# Patient Record
Sex: FEMALE | Race: WHITE | NOT HISPANIC OR LATINO
[De-identification: names, ages, dates, MRNs, and addresses within clinical notes are randomized per-mention and may not be internally consistent; named-entity substitution may affect disease eponyms.]

---

## 2017-09-20 ENCOUNTER — TRANSCRIPTION ENCOUNTER (OUTPATIENT)
Age: 77
End: 2017-09-20

## 2017-09-23 ENCOUNTER — TRANSCRIPTION ENCOUNTER (OUTPATIENT)
Age: 77
End: 2017-09-23

## 2018-10-08 ENCOUNTER — APPOINTMENT (OUTPATIENT)
Dept: ORTHOPEDIC SURGERY | Facility: CLINIC | Age: 78
End: 2018-10-08
Payer: MEDICARE

## 2018-10-08 VITALS
HEART RATE: 55 BPM | HEIGHT: 64 IN | SYSTOLIC BLOOD PRESSURE: 133 MMHG | DIASTOLIC BLOOD PRESSURE: 80 MMHG | WEIGHT: 155 LBS | BODY MASS INDEX: 26.46 KG/M2

## 2018-10-08 DIAGNOSIS — M25.552 PAIN IN LEFT HIP: ICD-10-CM

## 2018-10-08 DIAGNOSIS — M47.816 SPONDYLOSIS W/OUT MYELOPATHY OR RADICULOPATHY, LUMBAR REGION: ICD-10-CM

## 2018-10-08 DIAGNOSIS — M17.0 BILATERAL PRIMARY OSTEOARTHRITIS OF KNEE: ICD-10-CM

## 2018-10-08 PROCEDURE — 99204 OFFICE O/P NEW MOD 45 MIN: CPT

## 2018-10-08 PROCEDURE — 73502 X-RAY EXAM HIP UNI 2-3 VIEWS: CPT

## 2022-06-30 ENCOUNTER — APPOINTMENT (OUTPATIENT)
Dept: PODIATRY | Facility: CLINIC | Age: 82
End: 2022-06-30

## 2022-06-30 VITALS — BODY MASS INDEX: 26.46 KG/M2 | HEIGHT: 64 IN | WEIGHT: 155 LBS

## 2022-06-30 DIAGNOSIS — S99.921A UNSPECIFIED INJURY OF RIGHT FOOT, INITIAL ENCOUNTER: ICD-10-CM

## 2022-06-30 DIAGNOSIS — S92.514A NONDISPLACED FRACTURE OF PROXIMAL PHALANX OF RIGHT LESSER TOE(S), INITIAL ENCOUNTER FOR CLOSED FRACTURE: ICD-10-CM

## 2022-06-30 DIAGNOSIS — S99.929A UNSPECIFIED INJURY OF UNSPECIFIED FOOT, INITIAL ENCOUNTER: ICD-10-CM

## 2022-06-30 PROCEDURE — 99203 OFFICE O/P NEW LOW 30 MIN: CPT

## 2022-06-30 PROCEDURE — 73630 X-RAY EXAM OF FOOT: CPT | Mod: RT

## 2022-06-30 NOTE — PHYSICAL EXAM
[Mild] : mild swelling of dorsal foot [2nd] : 2nd [3rd] : 3rd [NL 30)] : inversion 30 degrees [NL (40)] : MTP joint DF 40 degrees [NL (20)] : MTP joint PF 20 degrees [5___] : [facility] 5[unfilled]/5 [2+] : posterior tibialis pulse: 2+ [Right] : right foot [The fracture is in acceptable alignment. There is progression in healing seen] : The fracture is in acceptable alignment. There is progression in healing seen [Fracture] : Fracture [] : non-antalgic [de-identified] : Proximal phalanx 2nd and 3rd digit right foot

## 2022-08-02 ENCOUNTER — FORM ENCOUNTER (OUTPATIENT)
Age: 82
End: 2022-08-02

## 2022-08-08 ENCOUNTER — OUTPATIENT (OUTPATIENT)
Dept: OUTPATIENT SERVICES | Facility: HOSPITAL | Age: 82
LOS: 1 days | End: 2022-08-08
Payer: MEDICARE

## 2022-08-08 ENCOUNTER — APPOINTMENT (OUTPATIENT)
Dept: CT IMAGING | Facility: IMAGING CENTER | Age: 82
End: 2022-08-08

## 2022-08-08 DIAGNOSIS — Z00.8 ENCOUNTER FOR OTHER GENERAL EXAMINATION: ICD-10-CM

## 2022-08-08 PROCEDURE — 70450 CT HEAD/BRAIN W/O DYE: CPT | Mod: 26,MH

## 2022-08-08 PROCEDURE — 70450 CT HEAD/BRAIN W/O DYE: CPT | Mod: MH

## 2022-08-12 ENCOUNTER — FORM ENCOUNTER (OUTPATIENT)
Age: 82
End: 2022-08-12

## 2022-08-17 ENCOUNTER — OUTPATIENT (OUTPATIENT)
Dept: OUTPATIENT SERVICES | Facility: HOSPITAL | Age: 82
LOS: 1 days | End: 2022-08-17
Payer: MEDICARE

## 2022-08-17 ENCOUNTER — APPOINTMENT (OUTPATIENT)
Dept: MRI IMAGING | Facility: HOSPITAL | Age: 82
End: 2022-08-17

## 2022-08-17 DIAGNOSIS — S63.592A OTHER SPECIFIED SPRAIN OF LEFT WRIST, INITIAL ENCOUNTER: ICD-10-CM
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
## 2022-08-17 DIAGNOSIS — R60.0 LOCALIZED EDEMA: ICD-10-CM

## 2022-08-17 DIAGNOSIS — S62.115A NONDISPLACED FRACTURE OF TRIQUETRUM [CUNEIFORM] BONE, LEFT WRIST, INITIAL ENCOUNTER FOR CLOSED FRACTURE: ICD-10-CM

## 2022-08-17 DIAGNOSIS — M25.532 PAIN IN LEFT WRIST: ICD-10-CM

## 2022-08-17 PROCEDURE — 73221 MRI JOINT UPR EXTREM W/O DYE: CPT | Mod: MH

## 2022-08-17 PROCEDURE — 73221 MRI JOINT UPR EXTREM W/O DYE: CPT | Mod: 26,LT,MH

## 2022-09-27 ENCOUNTER — APPOINTMENT (OUTPATIENT)
Dept: ORTHOPEDIC SURGERY | Facility: CLINIC | Age: 82
End: 2022-09-27

## 2022-09-27 DIAGNOSIS — S46.011A STRAIN OF MUSCLE(S) AND TENDON(S) OF THE ROTATOR CUFF OF RIGHT SHOULDER, INITIAL ENCOUNTER: ICD-10-CM

## 2022-09-27 DIAGNOSIS — M17.12 UNILATERAL PRIMARY OSTEOARTHRITIS, LEFT KNEE: ICD-10-CM

## 2022-09-27 PROCEDURE — 99204 OFFICE O/P NEW MOD 45 MIN: CPT | Mod: 25

## 2022-09-27 PROCEDURE — 20610 DRAIN/INJ JOINT/BURSA W/O US: CPT | Mod: LT

## 2022-09-27 NOTE — DISCUSSION/SUMMARY
[de-identified] : She tolerated the local injection very well and following the injection her pain was remarkably improved and her left knee she will try physical therapy for her right shoulder and to follow conservative measures for her left knee.  She is told to be cautious with nonsteroidal anti-inflammatory agents but she will speak to her medical doctor she does have a prescription for meloxicam she does she will take superior sparingly if she has discomfort return visit in 3 to 6 months.

## 2022-09-27 NOTE — PROCEDURE
[de-identified] : Procedure Note:\par \par Anatomic Location:  Left Knee\par \par Diagnosis:  Arthritis\par \par Procedure:  Injection of 2cc  of Marcaine 0.25% plain and Celestone 1cc, 6mg\par \par Local Spray: Ethyl Chloride.\par \par \par Patient has consented for the procedure.\par \par Injection  through a lateral parapatella approach.\par \par Patient tolerated the procedure well.\par \par Patient instructed to call the office if any reaction, fever, chills, increased erythema or swelling.   [phone].

## 2022-09-27 NOTE — HISTORY OF PRESENT ILLNESS
[de-identified] : 82 year old female presents today with left knee pain. She had two falls in July and August 2022 falling down steps sustaining injuries to her left wrist, right shoulder and both knees. She has been under the care of Dr. Hernandez for left knee pain and Dr. Whyte for a left wrist fracture which is now healed per patient. She was treated with a gel One injection to the left knee with hyaluronic acid on September 5, 2022 which has not helped thus far. She occasionally takes Advil 600 mg which helps minimally. She has difficulty standing from a chair. She ambulates with a cane. She complains of lesser pain in the right knee. \par The patient presents with xray and MRI images of the left knee for review. \par

## 2022-09-27 NOTE — PHYSICAL EXAM
[de-identified] : Constitutional - the patient is of normal build and nutrition.  The patient remains oriented to person, time, and  place.  Mood is normal. Vital signs as recorded.  The patients gait is with pain in her left knee. The patient has satisfactory  balance and can stand on toes and heels.  She comes in her with her  who is a retired dentist.\par \par The patient has no difficulty with respiration. Respiration at rest is a normal rate. The patient is not short of breath and has not become short of breath with short ambulation. There is no audible wheezing. No coughing.\par \par Skin is normal for the patient's age. There are no abnormal masses or lymph nodes which stand out in the lower extremities.\par \par Spine - deep tendon reflexes are symmetric. Motor and sensory are symmetric.\par \par \par UPPER EXTREMITIES \par \par Shoulders her left shoulder is a full range of motion and no pain with motion in her right shoulder does abduct to 150 degrees slowly flexes 160 degrees internally rotates 100 degrees external rotate 45 degrees she has pain in the area of the rotator cuff and pain with her arm and shoulder at 90–90 position and external rotation of her shoulder.  This does indicate is some impingement of her right rotator.  This was injected by Dr. Sr with cortisone.  She has had some improvement since.\par \par There is normal motion in the wrists and elbows.  She has had a fracture of her left wrist.\par \par Circulation appears satisfactory with pedal pulses present.  There is no major edema in the lower legs. No skin tenderness or increased temperature. No major varicosities.\par \par HIP EXAMINATION the abduction and abduction as well as rotation measurements were taken with the hip in flexion.\par \par Motion\par There is symmetric motion with flexion 135 degrees, abduction 80 degrees, adduction 30 degrees, external rotation 80 degrees, internal rotation 20 degrees.\par \par The hips have good range of motion. There is good strength across the hips. There is no crepitus in either hip. The alignment of the hips is normal.\par \par \par KNEE EXAMINATION\par \par Motion\par Right Knee has 0 to 135 degrees of motion with good medial  lateral and anterior posterior stability.  There is no major effusion.  There is no Baker's cyst.  There is no significant patellofemoral crepitus.  The patient has satisfactory strength across the knee.               \par Left  Knee   has 0 to 125 degrees of motion with good medial lateral and anterior posterior stability.  At this time she has no significant effusion and no Baker's cyst.  She has pain with palpation over the lateral joint line and a positive lateral Steinmann test and some discomfort with compression of her patella.  She did receive an injection with hyaluronic acid recently but it is not causing any significant improvement.\par \par \par Ankle and foot examination\par Of the ankle has normal motion.  There is normal ankle stability.  The patient has no major abnormalities of the foot.  She recently fractured toes 2 and 3 in her right foot.  Is managing well.\par \par \par \par  [de-identified] : She has had a x-ray of her knee which shows normal alignment of her joint it is medial lateral joint spaces are maintained.  However the MRI did show tricompartmental arthritis with significant wear of the anterior portion of the lateral meniscus mucoid degeneration of the anterior cruciate but without tear.  Impression mild degenerative osteoarthritis of the left knee with a degenerative lateral meniscus.

## 2024-09-12 ENCOUNTER — TRANSCRIPTION ENCOUNTER (OUTPATIENT)
Age: 84
End: 2024-09-12

## 2024-09-12 ENCOUNTER — RESULT REVIEW (OUTPATIENT)
Age: 84
End: 2024-09-12

## 2024-09-12 ENCOUNTER — INPATIENT (INPATIENT)
Facility: HOSPITAL | Age: 84
LOS: 4 days | Discharge: NURSING, REHAB OR LONG-TERM CARE | End: 2024-09-17
Attending: ORTHOPAEDIC SURGERY | Admitting: ORTHOPAEDIC SURGERY
Payer: MEDICARE

## 2024-09-12 VITALS
DIASTOLIC BLOOD PRESSURE: 98 MMHG | SYSTOLIC BLOOD PRESSURE: 173 MMHG | OXYGEN SATURATION: 97 % | RESPIRATION RATE: 15 BRPM | WEIGHT: 154.98 LBS | TEMPERATURE: 98 F | HEART RATE: 59 BPM

## 2024-09-12 DIAGNOSIS — Z29.9 ENCOUNTER FOR PROPHYLACTIC MEASURES, UNSPECIFIED: ICD-10-CM

## 2024-09-12 DIAGNOSIS — S72.001A FRACTURE OF UNSPECIFIED PART OF NECK OF RIGHT FEMUR, INITIAL ENCOUNTER FOR CLOSED FRACTURE: ICD-10-CM

## 2024-09-12 DIAGNOSIS — I25.10 ATHEROSCLEROTIC HEART DISEASE OF NATIVE CORONARY ARTERY WITHOUT ANGINA PECTORIS: ICD-10-CM

## 2024-09-12 DIAGNOSIS — I10 ESSENTIAL (PRIMARY) HYPERTENSION: ICD-10-CM

## 2024-09-12 DIAGNOSIS — S72.144A NONDISPLACED INTERTROCHANTERIC FRACTURE OF RIGHT FEMUR, INITIAL ENCOUNTER FOR CLOSED FRACTURE: ICD-10-CM

## 2024-09-12 LAB
ALBUMIN SERPL ELPH-MCNC: 3.6 G/DL — SIGNIFICANT CHANGE UP (ref 3.3–5)
ALP SERPL-CCNC: 81 U/L — SIGNIFICANT CHANGE UP (ref 40–120)
ALT FLD-CCNC: 30 U/L — SIGNIFICANT CHANGE UP (ref 4–33)
ANION GAP SERPL CALC-SCNC: 12 MMOL/L — SIGNIFICANT CHANGE UP (ref 7–14)
APPEARANCE UR: ABNORMAL
APTT BLD: 31.3 SEC — SIGNIFICANT CHANGE UP (ref 24.5–35.6)
AST SERPL-CCNC: 36 U/L — HIGH (ref 4–32)
BACTERIA # UR AUTO: NEGATIVE /HPF — SIGNIFICANT CHANGE UP
BASOPHILS # BLD AUTO: 0.05 K/UL — SIGNIFICANT CHANGE UP (ref 0–0.2)
BASOPHILS NFR BLD AUTO: 0.6 % — SIGNIFICANT CHANGE UP (ref 0–2)
BILIRUB SERPL-MCNC: 0.3 MG/DL — SIGNIFICANT CHANGE UP (ref 0.2–1.2)
BILIRUB UR-MCNC: NEGATIVE — SIGNIFICANT CHANGE UP
BLD GP AB SCN SERPL QL: NEGATIVE — SIGNIFICANT CHANGE UP
BUN SERPL-MCNC: 18 MG/DL — SIGNIFICANT CHANGE UP (ref 7–23)
CALCIUM SERPL-MCNC: 8.9 MG/DL — SIGNIFICANT CHANGE UP (ref 8.4–10.5)
CAST: 0 /LPF — SIGNIFICANT CHANGE UP (ref 0–4)
CHLORIDE SERPL-SCNC: 103 MMOL/L — SIGNIFICANT CHANGE UP (ref 98–107)
CO2 SERPL-SCNC: 24 MMOL/L — SIGNIFICANT CHANGE UP (ref 22–31)
COLOR SPEC: YELLOW — SIGNIFICANT CHANGE UP
CREAT SERPL-MCNC: 0.62 MG/DL — SIGNIFICANT CHANGE UP (ref 0.5–1.3)
DIFF PNL FLD: NEGATIVE — SIGNIFICANT CHANGE UP
EGFR: 87 ML/MIN/1.73M2 — SIGNIFICANT CHANGE UP
EOSINOPHIL # BLD AUTO: 0.17 K/UL — SIGNIFICANT CHANGE UP (ref 0–0.5)
EOSINOPHIL NFR BLD AUTO: 1.9 % — SIGNIFICANT CHANGE UP (ref 0–6)
GLUCOSE SERPL-MCNC: 113 MG/DL — HIGH (ref 70–99)
GLUCOSE UR QL: NEGATIVE MG/DL — SIGNIFICANT CHANGE UP
HCT VFR BLD CALC: 38.4 % — SIGNIFICANT CHANGE UP (ref 34.5–45)
HGB BLD-MCNC: 12.6 G/DL — SIGNIFICANT CHANGE UP (ref 11.5–15.5)
IANC: 7.47 K/UL — HIGH (ref 1.8–7.4)
IMM GRANULOCYTES NFR BLD AUTO: 1.3 % — HIGH (ref 0–0.9)
INR BLD: 0.98 RATIO — SIGNIFICANT CHANGE UP (ref 0.85–1.18)
KETONES UR-MCNC: NEGATIVE MG/DL — SIGNIFICANT CHANGE UP
LEUKOCYTE ESTERASE UR-ACNC: NEGATIVE — SIGNIFICANT CHANGE UP
LYMPHOCYTES # BLD AUTO: 0.69 K/UL — LOW (ref 1–3.3)
LYMPHOCYTES # BLD AUTO: 7.6 % — LOW (ref 13–44)
MCHC RBC-ENTMCNC: 30.6 PG — SIGNIFICANT CHANGE UP (ref 27–34)
MCHC RBC-ENTMCNC: 32.8 GM/DL — SIGNIFICANT CHANGE UP (ref 32–36)
MCV RBC AUTO: 93.2 FL — SIGNIFICANT CHANGE UP (ref 80–100)
MONOCYTES # BLD AUTO: 0.59 K/UL — SIGNIFICANT CHANGE UP (ref 0–0.9)
MONOCYTES NFR BLD AUTO: 6.5 % — SIGNIFICANT CHANGE UP (ref 2–14)
NEUTROPHILS # BLD AUTO: 7.47 K/UL — HIGH (ref 1.8–7.4)
NEUTROPHILS NFR BLD AUTO: 82.1 % — HIGH (ref 43–77)
NITRITE UR-MCNC: NEGATIVE — SIGNIFICANT CHANGE UP
NRBC # BLD: 0 /100 WBCS — SIGNIFICANT CHANGE UP (ref 0–0)
NRBC # FLD: 0 K/UL — SIGNIFICANT CHANGE UP (ref 0–0)
PH UR: 8 — SIGNIFICANT CHANGE UP (ref 5–8)
PLATELET # BLD AUTO: 213 K/UL — SIGNIFICANT CHANGE UP (ref 150–400)
POTASSIUM SERPL-MCNC: 4.2 MMOL/L — SIGNIFICANT CHANGE UP (ref 3.5–5.3)
POTASSIUM SERPL-SCNC: 4.2 MMOL/L — SIGNIFICANT CHANGE UP (ref 3.5–5.3)
PROT SERPL-MCNC: 6.1 G/DL — SIGNIFICANT CHANGE UP (ref 6–8.3)
PROT UR-MCNC: NEGATIVE MG/DL — SIGNIFICANT CHANGE UP
PROTHROM AB SERPL-ACNC: 11.1 SEC — SIGNIFICANT CHANGE UP (ref 9.5–13)
RBC # BLD: 4.12 M/UL — SIGNIFICANT CHANGE UP (ref 3.8–5.2)
RBC # FLD: 15.8 % — HIGH (ref 10.3–14.5)
RBC CASTS # UR COMP ASSIST: 2 /HPF — SIGNIFICANT CHANGE UP (ref 0–4)
RH IG SCN BLD-IMP: POSITIVE — SIGNIFICANT CHANGE UP
SODIUM SERPL-SCNC: 139 MMOL/L — SIGNIFICANT CHANGE UP (ref 135–145)
SP GR SPEC: 1.02 — SIGNIFICANT CHANGE UP (ref 1–1.03)
SQUAMOUS # UR AUTO: 1 /HPF — SIGNIFICANT CHANGE UP (ref 0–5)
UROBILINOGEN FLD QL: 0.2 MG/DL — SIGNIFICANT CHANGE UP (ref 0.2–1)
WBC # BLD: 9.09 K/UL — SIGNIFICANT CHANGE UP (ref 3.8–10.5)
WBC # FLD AUTO: 9.09 K/UL — SIGNIFICANT CHANGE UP (ref 3.8–10.5)
WBC UR QL: 0 /HPF — SIGNIFICANT CHANGE UP (ref 0–5)

## 2024-09-12 PROCEDURE — 93306 TTE W/DOPPLER COMPLETE: CPT | Mod: 26

## 2024-09-12 PROCEDURE — 99223 1ST HOSP IP/OBS HIGH 75: CPT

## 2024-09-12 PROCEDURE — 73502 X-RAY EXAM HIP UNI 2-3 VIEWS: CPT | Mod: 26,RT

## 2024-09-12 PROCEDURE — 99285 EMERGENCY DEPT VISIT HI MDM: CPT | Mod: GC

## 2024-09-12 PROCEDURE — 73562 X-RAY EXAM OF KNEE 3: CPT | Mod: 26,RT

## 2024-09-12 PROCEDURE — 71045 X-RAY EXAM CHEST 1 VIEW: CPT | Mod: 26

## 2024-09-12 PROCEDURE — 73552 X-RAY EXAM OF FEMUR 2/>: CPT | Mod: 26,RT

## 2024-09-12 RX ORDER — OXYCODONE HYDROCHLORIDE 5 MG/1
2.5 TABLET ORAL EVERY 4 HOURS
Refills: 0 | Status: DISCONTINUED | OUTPATIENT
Start: 2024-09-12 | End: 2024-09-12

## 2024-09-12 RX ORDER — SERTRALINE HYDROCHLORIDE 50 MG/1
1 TABLET, FILM COATED ORAL
Refills: 0 | DISCHARGE

## 2024-09-12 RX ORDER — ONDANSETRON 2 MG/ML
4 INJECTION, SOLUTION INTRAMUSCULAR; INTRAVENOUS ONCE
Refills: 0 | Status: COMPLETED | OUTPATIENT
Start: 2024-09-12 | End: 2024-09-12

## 2024-09-12 RX ORDER — EZETIMIBE 10 MG/1
10 TABLET ORAL DAILY
Refills: 0 | Status: DISCONTINUED | OUTPATIENT
Start: 2024-09-12 | End: 2024-09-17

## 2024-09-12 RX ORDER — EZETIMIBE 10 MG/1
1 TABLET ORAL
Refills: 0 | DISCHARGE

## 2024-09-12 RX ORDER — ROSUVASTATIN CALCIUM 10 MG/1
1 TABLET ORAL
Refills: 0 | DISCHARGE

## 2024-09-12 RX ORDER — POVIDONE-IODINE 10 %
1 SOLUTION, NON-ORAL TOPICAL ONCE
Refills: 0 | Status: COMPLETED | OUTPATIENT
Start: 2024-09-12 | End: 2024-09-13

## 2024-09-12 RX ORDER — ATENOLOL 100 MG
1 TABLET ORAL
Refills: 0 | DISCHARGE

## 2024-09-12 RX ORDER — ENOXAPARIN SODIUM 100 MG/ML
40 INJECTION SUBCUTANEOUS ONCE
Refills: 0 | Status: COMPLETED | OUTPATIENT
Start: 2024-09-12 | End: 2024-09-12

## 2024-09-12 RX ORDER — RANOLAZINE 500 MG/1
500 TABLET, FILM COATED, EXTENDED RELEASE ORAL
Refills: 0 | Status: DISCONTINUED | OUTPATIENT
Start: 2024-09-12 | End: 2024-09-17

## 2024-09-12 RX ORDER — SODIUM CHLORIDE 9 MG/ML
1000 INJECTION INTRAMUSCULAR; INTRAVENOUS; SUBCUTANEOUS
Refills: 0 | Status: DISCONTINUED | OUTPATIENT
Start: 2024-09-12 | End: 2024-09-16

## 2024-09-12 RX ORDER — RANOLAZINE 500 MG/1
1 TABLET, FILM COATED, EXTENDED RELEASE ORAL
Refills: 0 | DISCHARGE

## 2024-09-12 RX ORDER — FLU VACCINE TS 2012-2013(5YR+) 45MCG/.5ML
0.5 VIAL (ML) INTRAMUSCULAR ONCE
Refills: 0 | Status: COMPLETED | OUTPATIENT
Start: 2024-09-12 | End: 2024-09-12

## 2024-09-12 RX ORDER — ACETAMINOPHEN 325 MG/1
1000 TABLET ORAL ONCE
Refills: 0 | Status: COMPLETED | OUTPATIENT
Start: 2024-09-12 | End: 2024-09-12

## 2024-09-12 RX ORDER — OXYCODONE HYDROCHLORIDE 5 MG/1
5 TABLET ORAL EVERY 4 HOURS
Refills: 0 | Status: DISCONTINUED | OUTPATIENT
Start: 2024-09-12 | End: 2024-09-12

## 2024-09-12 RX ORDER — SERTRALINE HYDROCHLORIDE 50 MG/1
200 TABLET, FILM COATED ORAL DAILY
Refills: 0 | Status: DISCONTINUED | OUTPATIENT
Start: 2024-09-12 | End: 2024-09-17

## 2024-09-12 RX ORDER — ACETAMINOPHEN 325 MG/1
1000 TABLET ORAL ONCE
Refills: 0 | Status: DISCONTINUED | OUTPATIENT
Start: 2024-09-12 | End: 2024-09-12

## 2024-09-12 RX ORDER — METHENAMINE MANDELATE 1 G
1 TABLET ORAL
Refills: 0 | DISCHARGE

## 2024-09-12 RX ORDER — OXYCODONE HYDROCHLORIDE 5 MG/1
10 TABLET ORAL EVERY 4 HOURS
Refills: 0 | Status: DISCONTINUED | OUTPATIENT
Start: 2024-09-12 | End: 2024-09-17

## 2024-09-12 RX ORDER — CHLORHEXIDINE GLUCONATE 40 MG/ML
1 SOLUTION TOPICAL ONCE
Refills: 0 | Status: COMPLETED | OUTPATIENT
Start: 2024-09-12 | End: 2024-09-13

## 2024-09-12 RX ORDER — ATENOLOL 100 MG
25 TABLET ORAL DAILY
Refills: 0 | Status: DISCONTINUED | OUTPATIENT
Start: 2024-09-12 | End: 2024-09-17

## 2024-09-12 RX ORDER — OXYCODONE HYDROCHLORIDE 5 MG/1
5 TABLET ORAL EVERY 4 HOURS
Refills: 0 | Status: DISCONTINUED | OUTPATIENT
Start: 2024-09-12 | End: 2024-09-17

## 2024-09-12 RX ORDER — ROSUVASTATIN CALCIUM 10 MG/1
20 TABLET ORAL AT BEDTIME
Refills: 0 | Status: DISCONTINUED | OUTPATIENT
Start: 2024-09-12 | End: 2024-09-17

## 2024-09-12 RX ORDER — SENNA 187 MG
2 TABLET ORAL AT BEDTIME
Refills: 0 | Status: DISCONTINUED | OUTPATIENT
Start: 2024-09-12 | End: 2024-09-17

## 2024-09-12 RX ADMIN — ROSUVASTATIN CALCIUM 20 MILLIGRAM(S): 10 TABLET ORAL at 22:41

## 2024-09-12 RX ADMIN — OXYCODONE HYDROCHLORIDE 5 MILLIGRAM(S): 5 TABLET ORAL at 17:10

## 2024-09-12 RX ADMIN — Medication 4 MILLIGRAM(S): at 05:58

## 2024-09-12 RX ADMIN — OXYCODONE HYDROCHLORIDE 10 MILLIGRAM(S): 5 TABLET ORAL at 19:35

## 2024-09-12 RX ADMIN — Medication 4 MILLIGRAM(S): at 17:11

## 2024-09-12 RX ADMIN — ENOXAPARIN SODIUM 40 MILLIGRAM(S): 100 INJECTION SUBCUTANEOUS at 19:36

## 2024-09-12 RX ADMIN — SODIUM CHLORIDE 125 MILLILITER(S): 9 INJECTION INTRAMUSCULAR; INTRAVENOUS; SUBCUTANEOUS at 19:36

## 2024-09-12 RX ADMIN — OXYCODONE HYDROCHLORIDE 10 MILLIGRAM(S): 5 TABLET ORAL at 20:14

## 2024-09-12 RX ADMIN — Medication 4 MILLIGRAM(S): at 09:55

## 2024-09-12 RX ADMIN — OXYCODONE HYDROCHLORIDE 5 MILLIGRAM(S): 5 TABLET ORAL at 15:58

## 2024-09-12 RX ADMIN — OXYCODONE HYDROCHLORIDE 5 MILLIGRAM(S): 5 TABLET ORAL at 08:54

## 2024-09-12 NOTE — H&P ADULT - HISTORY OF PRESENT ILLNESS
84yFemale c/o R hip pain s/p mechanical fall while walking to the bathroom at 3am. Patient denies head hit or LOC. Patient denies numbness or tingling in the RLE. Patient denies any other injuries. At baseline, ambulates w RW. Pt takes aspirin 81 qd given she has multiple stents.    ROS: 10 point review of systems otherwise negative unless noted in HPI    PMH:    PSH:    AH:  Tylenol (Rash)    Meds: See med rec    T(C): 36.6 (09-12-24 @ 08:34)  HR: 57 (09-12-24 @ 08:34)  BP: 133/63 (09-12-24 @ 08:34)  RR: 16 (09-12-24 @ 08:34)  SpO2: 95% (09-12-24 @ 08:34)  Wt(kg): --                        12.6   9.09  )-----------( 213      ( 12 Sep 2024 06:00 )             38.4     09-12    139  |  103  |  18  ----------------------------<  113<H>  4.2   |  24  |  0.62    Ca    8.9      12 Sep 2024 06:00    TPro  6.1  /  Alb  3.6  /  TBili  0.3  /  DBili  x   /  AST  36<H>  /  ALT  30  /  AlkPhos  81  09-12    PT/INR - ( 12 Sep 2024 06:00 )   PT: 11.1 sec;   INR: 0.98 ratio         PTT - ( 12 Sep 2024 06:00 )  PTT:31.3 sec  Urinalysis Basic - ( 12 Sep 2024 06:00 )    Color: x / Appearance: x / SG: x / pH: x  Gluc: 113 mg/dL / Ketone: x  / Bili: x / Urobili: x   Blood: x / Protein: x / Nitrite: x   Leuk Esterase: x / RBC: x / WBC x   Sq Epi: x / Non Sq Epi: x / Bacteria: x        PE  Gen: NAD, alert and oriented  Resp: Unlabored breathing  RLE: Skin intact, no ecchymosis,        SILT DP/SP/ Brenda/Saph/Post Tib       +EHL/FHL/TA/Gastroc,        Knee/ankle painless ROM,        hip ROM limited 2/2 pain,       DP+,        soft compartments, no calf ttp,        +log roll.      Secondary:  No TTP over bony landmarks, SILT BL, ROM intact BL, distal pulses palpable.    Imaging:  XR demonstrating R IT fracture

## 2024-09-12 NOTE — CONSULT NOTE ADULT - ATTENDING COMMENTS
Personally saw and examined patient  labs and vitals reviewed  agree with above assessment and plan  84F HTN, HLD, CAD s/p PCI hx subdural hematoma s/p MMA embolization (2023), psoriasis. now p/w fall, hip frx  plan for surgical repair  pt able to ambulate up 1 flight of stairs (>4 mets)  denies syncope, vt vf scd  no edema orthopnea  tte today showing preserved lv systolic function, mild AS  pt is intermediate risk for intermediate risk orthopedic procedure and is optimized from a cv perspective and as procedure is urgent, no further cardiac testing is indicated and pt may proceed

## 2024-09-12 NOTE — CONSULT NOTE ADULT - SUBJECTIVE AND OBJECTIVE BOX
Lynda Jimenez MD  [facility] Division of Hospital Medicine  Pager [phone] (M-F 8AM-5PM)  Other Times: [id number]    Patient is a 84y old  Female who presents with a chief complaint of R IT fx (12 Sep 2024 09:14)    HPI:    84F with hx of CAD s/p PCI (26 years ago) on aspirin, SHD s/p MMA (), HTN, HLD who presents with right hip pain after fall found to have right hip fx pending right hip IMN (OR ). Patient is seen for preop examination. She says she had stents placed 26 years ago but still intermittently feels chest pain, and recently notes some LE swelling. She denies shortness of breath. She follows with cardiology in [place], workup reportedly negative. NO bleeding/clotting disorders. She denies fevers, chills, sob, abdominal pain, n/v/d, dysuria, sick contacts.     Review of Systems:   CONSTITUTIONAL: No fever, no fatigue  EYES: No eye pain or discharge  ENMT:  No sinus or throat pain  NECK: No pain or stiffness  RESPIRATORY: No cough, wheezing, chills or hemoptysis; No shortness of breath  CARDIOVASCULAR: No chest pain, palpitations, dizziness, or leg swelling  GASTROINTESTINAL: No abdominal or epigastric pain. No nausea, vomiting, or hematemesis; No diarrhea or constipation. No melena or hematochezia.  GENITOURINARY: No dysuria or incontinence  MUSCULOSKELETAL: No joint pain or swelling; No muscle, back, or extremity pain  NEUROLOGICAL: No headaches, memory loss, loss of strength, numbness, or tremors  PSYCHIATRIC: No depression, anxiety, mood swings, or difficulty sleeping  SKIN: No rashes, no skin changes    PAST MEDICAL & SURGICAL HISTORY:    FAMILY HISTORY:    SOCIAL HISTORY:     Allergies    sulfa drugs (Hives)  Tylenol (Rash)    Intolerances    Home Medications:  aspirin 81 mg oral tablet: 1 tab(s) orally once a day (12 Sep 2024 10:00)  atenolol 25 mg oral tablet: 1 tab(s) orally once a day (12 Sep 2024 10:00)  Crestor 20 mg oral tablet: 1 tab(s) orally once a day (12 Sep 2024 10:00)  Hiprex 1 g oral tablet: 1 tab(s) orally once a day (12 Sep 2024 10:00)  Ranexa 500 mg oral tablet, extended release: 1 tab(s) orally 2 times a day (12 Sep 2024 10:00)  sertraline 200 mg oral capsule: 1 cap(s) orally once a day (12 Sep 2024 10:00)  Zetia 10 mg oral tablet: 1 tab(s) orally once a day (12 Sep 2024 10:00)      MEDICATIONS  (STANDING):  atenolol  Tablet 25 milliGRAM(s) Oral daily  chlorhexidine 2% Cloths 1 Application(s) Topical once  enoxaparin Injectable 40 milliGRAM(s) SubCutaneous once  ezetimibe 10 milliGRAM(s) Oral daily  povidone iodine 10% Nasal Swab 1 Application(s) Both Nostrils once  ranolazine 500 milliGRAM(s) Oral two times a day  rosuvastatin 20 milliGRAM(s) Oral at bedtime  senna 2 Tablet(s) Oral at bedtime  sertraline 200 milliGRAM(s) Oral daily  sodium chloride 0.9%. 1000 milliLiter(s) (125 mL/Hr) IV Continuous <Continuous>    MEDICATIONS  (PRN):  oxyCODONE    IR 2.5 milliGRAM(s) Oral every 4 hours PRN Moderate Pain (4 - 6)  oxyCODONE    IR 5 milliGRAM(s) Oral every 4 hours PRN Severe Pain (7 - 10)      PHYSICAL EXAM:  Vital Signs Last 24 Hrs  T(C): 36.6 (12 Sep 2024 08:34), Max: 36.6 (12 Sep 2024 04:57)  T(F): 97.9 (12 Sep 2024 08:34), Max: 97.9 (12 Sep 2024 08:34)  HR: 57 (12 Sep 2024 08:34) (57 - 59)  BP: 133/63 (12 Sep 2024 08:34) (133/63 - 173/98)  RR: 16 (12 Sep 2024 08:34) (15 - 16)  SpO2: 95% (12 Sep 2024 08:34) (95% - 97%)    Parameters below as of 12 Sep 2024 08:34  Patient On (Oxygen Delivery Method): room air    CONSTITUTIONAL: resting in bed comfortably   EYES: no conjunctival or scleral injection, non-icteric, PERRLA  ENMT: no external nasal lesions, oral mucosa with moist membranes  NECK: trachea midline, no palpable neck mass   RESPIRATORY: breathing comfortably; lungs CTA without wheeze/rales/rhonchi  CARDIOVASCULAR: regular rate and rhythm; +S1S2, no murmurs, rubs, or gallops, no lower extremity edema, 2+ peripheral pulses  GASTROINTESTINAL: soft, nontender, nondistended; +BS throughout, no rebound/guarding  MUSCULOSKELETAL: RLE externally rotated   NEUROLOGIC: non-focal, sensation intact to light touch in b/l upper and lower extremities   PSYCHIATRIC: AAOx3, appropriate mood and affect  SKIN: no rashes or lesions, warm     LABS:                        12.6   9.09  )-----------( 213      ( 12 Sep 2024 06:00 )             38.4     09-12    139  |  103  |  18  ----------------------------<  113<H>  4.2   |  24  |  0.62    Ca    8.9      12 Sep 2024 06:00    TPro  6.1  /  Alb  3.6  /  TBili  0.3  /  DBili  x   /  AST  36<H>  /  ALT  30  /  AlkPhos  81  09-12    PT/INR - ( 12 Sep 2024 06:00 )   PT: 11.1 sec;   INR: 0.98 ratio         PTT - ( 12 Sep 2024 06:00 )  PTT:31.3 sec      Urinalysis Basic - ( 12 Sep 2024 08:59 )    Color: Yellow / Appearance: Turbid / S.016 / pH: x  Gluc: x / Ketone: Negative mg/dL  / Bili: Negative / Urobili: 0.2 mg/dL   Blood: x / Protein: Negative mg/dL / Nitrite: Negative   Leuk Esterase: Negative / RBC: 2 /HPF / WBC 0 /HPF   Sq Epi: x / Non Sq Epi: 1 /HPF / Bacteria: Negative /HPF        RADIOLOGY & ADDITIONAL TESTS:    EKG Personally Reviewed:    Imaging Personally Reviewed:    Care Discussed with Consultants/Other Providers:

## 2024-09-12 NOTE — CONSULT NOTE ADULT - PROBLEM SELECTOR RECOMMENDATION 9
s/p fall found to have right hip fracture pending OR  - management and pain control per ortho  - bowel regimen with opiates  - PT evaluation post op    PREOP: RCRI 1 (6% 30 day risk of MACE). METS >4. Reports intermittent chest pain and LE swelling. EKG 1st degree AV block. Obtain TTE, cardiology evaluation prior to OR. s/p fall found to have right hip fracture pending OR  - management and pain control per ortho  - bowel regimen with opiates  - PT evaluation post op    PREOP: RCRI 1 (6% 30 day risk of MACE). METS >4. EKG 1st degree AV block. TTE reviewed by cards - preserved LV function and mild AS. Medically optimized to proceed to OR. Intermediate risk for intermediate risk procedure.

## 2024-09-12 NOTE — ED PROVIDER NOTE - CLINICAL SUMMARY MEDICAL DECISION MAKING FREE TEXT BOX
58 female, history of hypertension, hyperlipidemia, on baby aspirin daily, presents to ED with complaints of pain to her right hip after fall prior to arrival.  Patient reports she woke up in the [place] to use restroom, leaned onto a chair however the chair slipped causing her to fall and land onto her right side.  Initial patient unsure of head trauma.  No anticoagulation medicines.  Was unable to stand back up.  Vital signs stable.  Patient elderly appearing, and in moderate acute distress secondary to pain, heart regular rate and rhythm, lungs clear, abdomen soft and nontender, tenderness over right hip, right lower extremity shortened and externally rotated, no tenderness over knee, lower leg, ankle or foot, distal neurovascularly intact, DP pulse intact.  Will assess for bony injury, acute intracranial pathology.  Plan for basic labs, type and screen, coags, x-ray right lower extremity, CT head and neck, Analgesics. 58 female, history of hypertension, hyperlipidemia, CAD with stents, on baby aspirin daily, presents to ED with complaints of pain to her right hip after fall prior to arrival.  Patient reports she woke up in the [place] to use restroom, leaned onto a chair however the chair slipped causing her to fall and land onto her right side.  Initial patient unsure of head trauma.  No anticoagulation medicines.  Was unable to stand back up.  Vital signs stable.  Patient elderly appearing, and in moderate acute distress secondary to pain, heart regular rate and rhythm, lungs clear, abdomen soft and nontender, tenderness over right hip, right lower extremity shortened and externally rotated, no tenderness over knee, lower leg, ankle or foot, distal neurovascularly intact, DP pulse intact.  Will assess for bony injury, acute intracranial pathology.  Plan for basic labs, type and screen, coags, x-ray right lower extremity, CT head and neck, Analgesics.

## 2024-09-12 NOTE — ED ADULT NURSE NOTE - NSFALLHARMRISKINTERV_ED_ALL_ED
Assistance OOB with selected safe patient handling equipment if applicable/Assistance with ambulation/Communicate risk of Fall with Harm to all staff, patient, and family/Monitor gait and stability/Provide visual cue: red socks, yellow wristband, yellow gown, etc/Reinforce activity limits and safety measures with patient and family/Bed in lowest position, wheels locked, appropriate side rails in place/Call bell, personal items and telephone in reach/Instruct patient to call for assistance before getting out of bed/chair/stretcher/Non-slip footwear applied when patient is off stretcher/[facility] to call system/Physically safe environment - no spills, clutter or unnecessary equipment/Purposeful Proactive Rounding/Room/bathroom lighting operational, light cord in reach

## 2024-09-12 NOTE — ED ADULT NURSE NOTE - OBJECTIVE STATEMENT
Pt received on stretcher A&Ox4, no labored breathing, pt recently had a mechanical fall at home and is no c/o 10/10 right hip pain.

## 2024-09-12 NOTE — H&P ADULT - ASSESSMENT
84yFemale with Right Intertrochanteric Fracture    Plan:  - OR for R hip IMN  - Pain control  - IS  - NPO/IVF  - EKG/CXR  - Medical clearance prior to planned procedure    Areli Nugent, PGY-2  Orthopedic Surgery  [id number]

## 2024-09-12 NOTE — CONSULT NOTE ADULT - ASSESSMENT
84yFemale with Right Intertrochanteric Fracture    Plan:  - OR for R hip IMN  - Pain control  - IS  - NPO/IVF  - EKG/CXR  - Medical clearance prior to planned procedure    Areli Nugent, PGY-2  Orthopedic Surgery  [id number]

## 2024-09-12 NOTE — CONSULT NOTE ADULT - ASSESSMENT
84 year old woman w/ PMHx of HTN, HLD, CAD s/p PCI to ?LCx c/b ISR (1998), chronic angina, subdural hematoma s/p MMA embolization (2023), psoriasis presenting for 1 day acute onset R hip after iso mechanical fall, found to have R hip fracture pending OR, cardiology consulted for pre-op evaluation    #CAD s/p PCI c/b ISR (1998)  #HTN  #HLD  #Chronic angina  - EKG: Incomplete RBBB, no ischemic changes    Recommendation:  - c/w ASA, statin, Ranexa  -        **Note incomplete until attending attestation 84 year old woman w/ PMHx of HTN, HLD, CAD s/p PCI to ?LCx c/b ISR (1998), chronic angina, subdural hematoma s/p MMA embolization (2023), psoriasis presenting for 1 day acute onset R hip after iso mechanical fall, found to have R hip fracture pending OR, cardiology consulted for pre-op evaluation    #CAD s/p PCI c/b ISR (1998)  #HTN  #HLD  #Chronic angina  #Systolic murmur  - EKG: Incomplete RBBB, 1st AV block, no ischemic changes    Recommendation:  - c/w ASA, statin, Ranexa  -       **Note incomplete until attending attestation 84 year old woman w/ PMHx of HTN, HLD, CAD s/p PCI to ?LCx c/b ISR (1998), chronic angina, subdural hematoma s/p MMA embolization (2023), psoriasis presenting for 1 day acute onset R hip after iso mechanical fall, found to have R hip fracture pending OR, cardiology consulted for pre-op evaluation    #CAD s/p PCI c/b ISR (1998)  #HTN  #HLD  #Chronic angina  #Systolic murmur  - EKG: Incomplete RBBB, 1st AV block, no ischemic changes    Recommendation:  - c/w ASA, statin, Ranexa  - TTE pending  - Further pre-op comment pending TTE result     **Note incomplete until attending attestation 84 year old woman w/ PMHx of HTN, HLD, CAD s/p PCI to ?LCx c/b ISR (1998), chronic angina, subdural hematoma s/p MMA embolization (2023), psoriasis presenting for 1 day acute onset R hip after iso mechanical fall, found to have R hip fracture pending OR, cardiology consulted for pre-op evaluation    #CAD s/p PCI c/b ISR (1998)  #HTN  #HLD  #Chronic angina  #Systolic murmur  - EKG: Incomplete RBBB, 1st AV block, no ischemic changes    Recommendation:  - c/w ASA, statin, Ranexa  - TTE with normal LVSF, mild AS  - Patient with RCRI of 1 (6% 30-day risk of death, MI, or cardiac arrest), METs > 4  - Medically optimized from cardiology standpoint to undergo planned intermediate risk orthopedic procedure    **Note incomplete until attending attestation

## 2024-09-12 NOTE — CONSULT NOTE ADULT - PROBLEM SELECTOR RECOMMENDATION 2
s/p PCI 26 years ago  - reports intermittent chest pain and LE swelling  - obtain records from cardiologist in [place]   - TTE ordered, follow up cards recommendations

## 2024-09-12 NOTE — CONSULT NOTE ADULT - ASSESSMENT
84F with hx of CAD s/p PCI (26 years ago) on aspirin, SHD s/p MMA (2023), HTN, HLD who presents with right hip pain after fall found to have right hip fx pending right hip IMN (OR 9/13).

## 2024-09-12 NOTE — ED ADULT TRIAGE NOTE - CHIEF COMPLAINT QUOTE
C/O right hip pain. S/P mechanical fall. denies hitting head or LOC. denies anticoagulation use. No complaints of chest pain, headache, nausea, dizziness, vomiting  SOB, fever, chills verbalized. Phx stents(2000) Subdural.

## 2024-09-12 NOTE — CONSULT NOTE ADULT - SUBJECTIVE AND OBJECTIVE BOX
Date of Admission: 84y    CHIEF COMPLAINT: Female    HISTORY OF PRESENT ILLNESS:   HPI:  84 year old woman w/ PMHx of HTN, HLD, CAD s/p PCI to ?LCx c/b ISR (1998), chronic angina, subdural hematoma s/p MMA embolization (2023), psoriasis. She presents with 1 day acute onset R hip pain after a mechanical fall, found to have R intertrochanteric fracture, pending OR with ortho. Patient states she has been having chronic angina (substernal, alleviated by nitro) about 3 times per year, last was 12/2023. Currently, patient endorse mild BLE swelling, but denies chest pain, denies SOB, dizziness, palpitations. States last TTE was with PCP 2 years ago and was told it was ok. Cardiology consulted for pre-op evaluation.      PMH:  - CAD s/p PCI  - HTN  - HLD  - Angina  - SDH  - Psoriasis    PSH:  - None    AH:  Tylenol (Rash)    Home Medications:  aspirin 81 mg oral tablet: 1 tab(s) orally once a day (12 Sep 2024 10:00)  atenolol 25 mg oral tablet: 1 tab(s) orally once a day (12 Sep 2024 10:00)  Crestor 20 mg oral tablet: 1 tab(s) orally once a day (12 Sep 2024 10:00)  Hiprex 1 g oral tablet: 1 tab(s) orally once a day (12 Sep 2024 10:00)  Ranexa 500 mg oral tablet, extended release: 1 tab(s) orally 2 times a day (12 Sep 2024 10:00)  sertraline 200 mg oral capsule: 1 cap(s) orally once a day (12 Sep 2024 10:00)  Zetia 10 mg oral tablet: 1 tab(s) orally once a day (12 Sep 2024 10:00)    MEDICATIONS  (STANDING):  atenolol  Tablet 25 milliGRAM(s) Oral daily  chlorhexidine 2% Cloths 1 Application(s) Topical once  enoxaparin Injectable 40 milliGRAM(s) SubCutaneous once  ezetimibe 10 milliGRAM(s) Oral daily  povidone iodine 10% Nasal Swab 1 Application(s) Both Nostrils once  ranolazine 500 milliGRAM(s) Oral two times a day  rosuvastatin 20 milliGRAM(s) Oral at bedtime  senna 2 Tablet(s) Oral at bedtime  sertraline 200 milliGRAM(s) Oral daily  sodium chloride 0.9%. 1000 milliLiter(s) (125 mL/Hr) IV Continuous <Continuous>    MEDICATIONS  (PRN):  oxyCODONE    IR 5 milliGRAM(s) Oral every 4 hours PRN Severe Pain (7 - 10)  oxyCODONE    IR 2.5 milliGRAM(s) Oral every 4 hours PRN Moderate Pain (4 - 6)      T(C): 36.6 (09-12-24 @ 08:34)  HR: 57 (09-12-24 @ 08:34)  BP: 133/63 (09-12-24 @ 08:34)  RR: 16 (09-12-24 @ 08:34)  SpO2: 95% (09-12-24 @ 08:34)  Wt(kg): --                   Physical exam:  General: Awake and alert, NAD  Cardiac: RRR, S1 S2, systolic ejection murmur, no JVD  Chest: Normal respiratory effort, CTABL  Abdomen: Soft NTND  Extremity: BLE trace edema to lower shin, RLE held in externally rotated position      Imaging:  XR demonstrating R IT fracture   (12 Sep 2024 09:14)      FAMILY HISTORY:  [ ] no pertinent family history of premature cardiovascular disease in first degree relatives.  Mother:   Father:   Siblings:     SOCIAL HISTORY:    Denies toxic habits    Allergies    sulfa drugs (Hives)  Tylenol (Rash)    Intolerances    	  LABS:	 	                          12.6   9.09  )-----------( 213      ( 12 Sep 2024 06:00 )             38.4     09-12    139  |  103  |  18  ----------------------------<  113<H>  4.2   |  24  |  0.62    Ca    8.9      12 Sep 2024 06:00    TPro  6.1  /  Alb  3.6  /  TBili  0.3  /  DBili  x   /  AST  36<H>  /  ALT  30  /  AlkPhos  81  09-12        PT/INR - ( 12 Sep 2024 06:00 )   PT: 11.1 sec;   INR: 0.98 ratio         PTT - ( 12 Sep 2024 06:00 )  PTT:31.3 sec    CARDIAC MARKERS:          	           Date of Admission: 84y    CHIEF COMPLAINT: Female    HISTORY OF PRESENT ILLNESS:   HPI:  84 year old woman w/ PMHx of HTN, HLD, CAD s/p PCI to ?LCx c/b ISR (1998), chronic angina, subdural hematoma s/p MMA embolization (2023), psoriasis. She presents with 1 day acute onset R hip pain after a mechanical fall, found to have R intertrochanteric fracture, pending OR with ortho. Patient states she has been having chronic angina (substernal, alleviated by nitro) about 3 times per year, last was 12/2023. Currently, patient endorse mild BLE swelling, but denies chest pain, denies SOB, dizziness, palpitations. States last TTE was with PCP 2 years ago and was told it was ok. At baseline patient ambulates with rolling walker, does not walk much, mostly sedentary. Cardiology consulted for pre-op evaluation.      PMH:  - CAD s/p PCI  - HTN  - HLD  - Angina  - SDH  - Psoriasis    PSH:  - None    AH:  Tylenol (Rash)    Home Medications:  aspirin 81 mg oral tablet: 1 tab(s) orally once a day (12 Sep 2024 10:00)  atenolol 25 mg oral tablet: 1 tab(s) orally once a day (12 Sep 2024 10:00)  Crestor 20 mg oral tablet: 1 tab(s) orally once a day (12 Sep 2024 10:00)  Hiprex 1 g oral tablet: 1 tab(s) orally once a day (12 Sep 2024 10:00)  Ranexa 500 mg oral tablet, extended release: 1 tab(s) orally 2 times a day (12 Sep 2024 10:00)  sertraline 200 mg oral capsule: 1 cap(s) orally once a day (12 Sep 2024 10:00)  Zetia 10 mg oral tablet: 1 tab(s) orally once a day (12 Sep 2024 10:00)    MEDICATIONS  (STANDING):  atenolol  Tablet 25 milliGRAM(s) Oral daily  chlorhexidine 2% Cloths 1 Application(s) Topical once  enoxaparin Injectable 40 milliGRAM(s) SubCutaneous once  ezetimibe 10 milliGRAM(s) Oral daily  povidone iodine 10% Nasal Swab 1 Application(s) Both Nostrils once  ranolazine 500 milliGRAM(s) Oral two times a day  rosuvastatin 20 milliGRAM(s) Oral at bedtime  senna 2 Tablet(s) Oral at bedtime  sertraline 200 milliGRAM(s) Oral daily  sodium chloride 0.9%. 1000 milliLiter(s) (125 mL/Hr) IV Continuous <Continuous>    MEDICATIONS  (PRN):  oxyCODONE    IR 5 milliGRAM(s) Oral every 4 hours PRN Severe Pain (7 - 10)  oxyCODONE    IR 2.5 milliGRAM(s) Oral every 4 hours PRN Moderate Pain (4 - 6)      T(C): 36.6 (09-12-24 @ 08:34)  HR: 57 (09-12-24 @ 08:34)  BP: 133/63 (09-12-24 @ 08:34)  RR: 16 (09-12-24 @ 08:34)  SpO2: 95% (09-12-24 @ 08:34)  Wt(kg): --                   Physical exam:  General: Awake and alert, NAD  Cardiac: RRR, S1 S2, systolic ejection murmur, no JVD  Chest: Normal respiratory effort, CTABL  Abdomen: Soft NTND  Extremity: BLE trace edema to lower shin, RLE held in externally rotated position      Imaging:  XR demonstrating R IT fracture   (12 Sep 2024 09:14)      FAMILY HISTORY:  [ ] no pertinent family history of premature cardiovascular disease in first degree relatives.  Mother:   Father:   Siblings:     SOCIAL HISTORY:    Denies toxic habits    Allergies    sulfa drugs (Hives)  Tylenol (Rash)    Intolerances    	  LABS:	 	                          12.6   9.09  )-----------( 213      ( 12 Sep 2024 06:00 )             38.4     09-12    139  |  103  |  18  ----------------------------<  113<H>  4.2   |  24  |  0.62    Ca    8.9      12 Sep 2024 06:00    TPro  6.1  /  Alb  3.6  /  TBili  0.3  /  DBili  x   /  AST  36<H>  /  ALT  30  /  AlkPhos  81  09-12        PT/INR - ( 12 Sep 2024 06:00 )   PT: 11.1 sec;   INR: 0.98 ratio         PTT - ( 12 Sep 2024 06:00 )  PTT:31.3 sec    CARDIAC MARKERS:          	           Date of Admission: 84y    CHIEF COMPLAINT: Female    HISTORY OF PRESENT ILLNESS:   HPI:  84 year old woman w/ PMHx of HTN, HLD, CAD s/p PCI to ?LCx c/b ISR (1998), chronic angina, subdural hematoma s/p MMA embolization (2023), psoriasis. She presents with 1 day acute onset R hip pain after a mechanical fall, found to have R intertrochanteric fracture, pending OR with ortho. Patient states she has been having chronic angina (substernal, alleviated by nitro) about 3 times per year, last was 12/2023. Currently, patient endorse mild BLE swelling, but denies chest pain, denies SOB, dizziness, palpitations. States last TTE was with PCP 2 years ago and was told it was ok. At baseline patient ambulates with rolling walker, can walk 4-5 blocks before stopping. Cardiology consulted for pre-op evaluation.      PMH:  - CAD s/p PCI  - HTN  - HLD  - Angina  - SDH  - Psoriasis    PSH:  - None    AH:  Tylenol (Rash)    Home Medications:  aspirin 81 mg oral tablet: 1 tab(s) orally once a day (12 Sep 2024 10:00)  atenolol 25 mg oral tablet: 1 tab(s) orally once a day (12 Sep 2024 10:00)  Crestor 20 mg oral tablet: 1 tab(s) orally once a day (12 Sep 2024 10:00)  Hiprex 1 g oral tablet: 1 tab(s) orally once a day (12 Sep 2024 10:00)  Ranexa 500 mg oral tablet, extended release: 1 tab(s) orally 2 times a day (12 Sep 2024 10:00)  sertraline 200 mg oral capsule: 1 cap(s) orally once a day (12 Sep 2024 10:00)  Zetia 10 mg oral tablet: 1 tab(s) orally once a day (12 Sep 2024 10:00)    MEDICATIONS  (STANDING):  atenolol  Tablet 25 milliGRAM(s) Oral daily  chlorhexidine 2% Cloths 1 Application(s) Topical once  enoxaparin Injectable 40 milliGRAM(s) SubCutaneous once  ezetimibe 10 milliGRAM(s) Oral daily  povidone iodine 10% Nasal Swab 1 Application(s) Both Nostrils once  ranolazine 500 milliGRAM(s) Oral two times a day  rosuvastatin 20 milliGRAM(s) Oral at bedtime  senna 2 Tablet(s) Oral at bedtime  sertraline 200 milliGRAM(s) Oral daily  sodium chloride 0.9%. 1000 milliLiter(s) (125 mL/Hr) IV Continuous <Continuous>    MEDICATIONS  (PRN):  oxyCODONE    IR 5 milliGRAM(s) Oral every 4 hours PRN Severe Pain (7 - 10)  oxyCODONE    IR 2.5 milliGRAM(s) Oral every 4 hours PRN Moderate Pain (4 - 6)      T(C): 36.6 (09-12-24 @ 08:34)  HR: 57 (09-12-24 @ 08:34)  BP: 133/63 (09-12-24 @ 08:34)  RR: 16 (09-12-24 @ 08:34)  SpO2: 95% (09-12-24 @ 08:34)  Wt(kg): --                   Physical exam:  General: Awake and alert, NAD  Cardiac: RRR, S1 S2, systolic ejection murmur, no JVD  Chest: Normal respiratory effort, CTABL  Abdomen: Soft NTND  Extremity: BLE trace edema to lower shin, RLE held in externally rotated position      Imaging:  XR demonstrating R IT fracture   (12 Sep 2024 09:14)      FAMILY HISTORY:  [ ] no pertinent family history of premature cardiovascular disease in first degree relatives.  Mother:   Father:   Siblings:     SOCIAL HISTORY:    Denies toxic habits    Allergies    sulfa drugs (Hives)  Tylenol (Rash)    Intolerances    	  LABS:	 	                          12.6   9.09  )-----------( 213      ( 12 Sep 2024 06:00 )             38.4     09-12    139  |  103  |  18  ----------------------------<  113<H>  4.2   |  24  |  0.62    Ca    8.9      12 Sep 2024 06:00    TPro  6.1  /  Alb  3.6  /  TBili  0.3  /  DBili  x   /  AST  36<H>  /  ALT  30  /  AlkPhos  81  09-12        PT/INR - ( 12 Sep 2024 06:00 )   PT: 11.1 sec;   INR: 0.98 ratio         PTT - ( 12 Sep 2024 06:00 )  PTT:31.3 sec    CARDIAC MARKERS:

## 2024-09-12 NOTE — ED PROVIDER NOTE - PROGRESS NOTE DETAILS
CHANDLER RAI: Patient signed out to me to f/u hip fracture, Xray hip cross table, CT head and   c spine, ortho consult. Pt seen by ortho, admit to Dr. Mathis. Pt refused Ct head and C spine, states she didn't hit her head, doesn't feel any bump on her head, adamantly refusing Ct scan at this time. Ortho resident made aware by attending. EKG completed. Pt admitted for hip fracture.

## 2024-09-12 NOTE — ED ADULT NURSE REASSESSMENT NOTE - NS ED NURSE REASSESS COMMENT FT1
Report received from YELENA Stevens. Patient VS stable. She c/o pain, medicated as ordered. States she cannot tolerate Tylenol.

## 2024-09-12 NOTE — ED PROVIDER NOTE - ATTENDING CONTRIBUTION TO CARE
84-year-old female with past medical history of hypertension, hyperlipidemia, CAD status post stents, coming in with pain in her right hip after she took a fall this morning.  Reports she woke up the night she used the restroom.  She was leaning onto a chair which slipped and she fell.  Was unable to get up herself.  Denies head trauma or LOC.   states he had a blood in went to her immediately.  Patient is nontoxic-appearing.  Appears uncomfortable with pain.  No respiratory distress.  Positive TTP over right hip.  Right lower extremity is externally rotated and shortened.  No TTP of the extremity otherwise.  DP pulses intact.  Sensation intact in lower extremities.  No spinal tenderness to palpation.  Abdomen soft nontender.  No obvious laceration or abrasions noted.  Concern for hip fracture.  Eval for electrolyte abnormalities, anemia.  Noted to have fracture–Ortho was consulted.  Patient is signed out pending Ortho recommendations.

## 2024-09-13 ENCOUNTER — APPOINTMENT (OUTPATIENT)
Dept: ORTHOPEDIC SURGERY | Facility: HOSPITAL | Age: 84
End: 2024-09-13

## 2024-09-13 ENCOUNTER — TRANSCRIPTION ENCOUNTER (OUTPATIENT)
Age: 84
End: 2024-09-13

## 2024-09-13 LAB
ANION GAP SERPL CALC-SCNC: 10 MMOL/L — SIGNIFICANT CHANGE UP (ref 7–14)
ANION GAP SERPL CALC-SCNC: 8 MMOL/L — SIGNIFICANT CHANGE UP (ref 7–14)
APTT BLD: 35.2 SEC — SIGNIFICANT CHANGE UP (ref 24.5–35.6)
BLD GP AB SCN SERPL QL: NEGATIVE — SIGNIFICANT CHANGE UP
BUN SERPL-MCNC: 10 MG/DL — SIGNIFICANT CHANGE UP (ref 7–23)
BUN SERPL-MCNC: 13 MG/DL — SIGNIFICANT CHANGE UP (ref 7–23)
CALCIUM SERPL-MCNC: 8.4 MG/DL — SIGNIFICANT CHANGE UP (ref 8.4–10.5)
CALCIUM SERPL-MCNC: 8.6 MG/DL — SIGNIFICANT CHANGE UP (ref 8.4–10.5)
CHLORIDE SERPL-SCNC: 103 MMOL/L — SIGNIFICANT CHANGE UP (ref 98–107)
CHLORIDE SERPL-SCNC: 105 MMOL/L — SIGNIFICANT CHANGE UP (ref 98–107)
CO2 SERPL-SCNC: 25 MMOL/L — SIGNIFICANT CHANGE UP (ref 22–31)
CO2 SERPL-SCNC: 26 MMOL/L — SIGNIFICANT CHANGE UP (ref 22–31)
CREAT SERPL-MCNC: 0.53 MG/DL — SIGNIFICANT CHANGE UP (ref 0.5–1.3)
CREAT SERPL-MCNC: 0.54 MG/DL — SIGNIFICANT CHANGE UP (ref 0.5–1.3)
EGFR: 90 ML/MIN/1.73M2 — SIGNIFICANT CHANGE UP
EGFR: 90 ML/MIN/1.73M2 — SIGNIFICANT CHANGE UP
GLUCOSE SERPL-MCNC: 116 MG/DL — HIGH (ref 70–99)
GLUCOSE SERPL-MCNC: 139 MG/DL — HIGH (ref 70–99)
HCT VFR BLD CALC: 33.2 % — LOW (ref 34.5–45)
HCT VFR BLD CALC: 35.1 % — SIGNIFICANT CHANGE UP (ref 34.5–45)
HGB BLD-MCNC: 10.5 G/DL — LOW (ref 11.5–15.5)
HGB BLD-MCNC: 11.7 G/DL — SIGNIFICANT CHANGE UP (ref 11.5–15.5)
INR BLD: 1.02 RATIO — SIGNIFICANT CHANGE UP (ref 0.85–1.18)
MCHC RBC-ENTMCNC: 30.2 PG — SIGNIFICANT CHANGE UP (ref 27–34)
MCHC RBC-ENTMCNC: 30.5 PG — SIGNIFICANT CHANGE UP (ref 27–34)
MCHC RBC-ENTMCNC: 31.6 GM/DL — LOW (ref 32–36)
MCHC RBC-ENTMCNC: 33.3 GM/DL — SIGNIFICANT CHANGE UP (ref 32–36)
MCV RBC AUTO: 91.6 FL — SIGNIFICANT CHANGE UP (ref 80–100)
MCV RBC AUTO: 95.4 FL — SIGNIFICANT CHANGE UP (ref 80–100)
NRBC # BLD: 0 /100 WBCS — SIGNIFICANT CHANGE UP (ref 0–0)
NRBC # BLD: 0 /100 WBCS — SIGNIFICANT CHANGE UP (ref 0–0)
NRBC # FLD: 0 K/UL — SIGNIFICANT CHANGE UP (ref 0–0)
NRBC # FLD: 0 K/UL — SIGNIFICANT CHANGE UP (ref 0–0)
PLATELET # BLD AUTO: 194 K/UL — SIGNIFICANT CHANGE UP (ref 150–400)
PLATELET # BLD AUTO: 212 K/UL — SIGNIFICANT CHANGE UP (ref 150–400)
POTASSIUM SERPL-MCNC: 3.7 MMOL/L — SIGNIFICANT CHANGE UP (ref 3.5–5.3)
POTASSIUM SERPL-MCNC: 3.8 MMOL/L — SIGNIFICANT CHANGE UP (ref 3.5–5.3)
POTASSIUM SERPL-SCNC: 3.7 MMOL/L — SIGNIFICANT CHANGE UP (ref 3.5–5.3)
POTASSIUM SERPL-SCNC: 3.8 MMOL/L — SIGNIFICANT CHANGE UP (ref 3.5–5.3)
PROTHROM AB SERPL-ACNC: 11.5 SEC — SIGNIFICANT CHANGE UP (ref 9.5–13)
RBC # BLD: 3.48 M/UL — LOW (ref 3.8–5.2)
RBC # BLD: 3.83 M/UL — SIGNIFICANT CHANGE UP (ref 3.8–5.2)
RBC # FLD: 15.7 % — HIGH (ref 10.3–14.5)
RBC # FLD: 15.9 % — HIGH (ref 10.3–14.5)
RH IG SCN BLD-IMP: POSITIVE — SIGNIFICANT CHANGE UP
SODIUM SERPL-SCNC: 138 MMOL/L — SIGNIFICANT CHANGE UP (ref 135–145)
SODIUM SERPL-SCNC: 139 MMOL/L — SIGNIFICANT CHANGE UP (ref 135–145)
WBC # BLD: 12.94 K/UL — HIGH (ref 3.8–10.5)
WBC # BLD: 9.14 K/UL — SIGNIFICANT CHANGE UP (ref 3.8–10.5)
WBC # FLD AUTO: 12.94 K/UL — HIGH (ref 3.8–10.5)
WBC # FLD AUTO: 9.14 K/UL — SIGNIFICANT CHANGE UP (ref 3.8–10.5)

## 2024-09-13 PROCEDURE — 27245 TREAT THIGH FRACTURE: CPT | Mod: RT

## 2024-09-13 PROCEDURE — 99232 SBSQ HOSP IP/OBS MODERATE 35: CPT

## 2024-09-13 DEVICE — IMPLANTABLE DEVICE: Type: IMPLANTABLE DEVICE | Site: RIGHT | Status: FUNCTIONAL

## 2024-09-13 DEVICE — NAIL CANN TFNA 130 DEG 11X235MM: Type: IMPLANTABLE DEVICE | Site: RIGHT | Status: FUNCTIONAL

## 2024-09-13 DEVICE — SCREW LOCKING 5X30MM: Type: IMPLANTABLE DEVICE | Site: RIGHT | Status: FUNCTIONAL

## 2024-09-13 RX ORDER — HYDROMORPHONE HYDROCHLORIDE 2 MG/1
0.25 TABLET ORAL
Refills: 0 | Status: DISCONTINUED | OUTPATIENT
Start: 2024-09-13 | End: 2024-09-13

## 2024-09-13 RX ORDER — APIXABAN 5 MG/1
2.5 TABLET, FILM COATED ORAL EVERY 12 HOURS
Refills: 0 | Status: DISCONTINUED | OUTPATIENT
Start: 2024-09-14 | End: 2024-09-17

## 2024-09-13 RX ORDER — ONDANSETRON 2 MG/ML
4 INJECTION, SOLUTION INTRAMUSCULAR; INTRAVENOUS ONCE
Refills: 0 | Status: DISCONTINUED | OUTPATIENT
Start: 2024-09-13 | End: 2024-09-13

## 2024-09-13 RX ORDER — HYDROMORPHONE HYDROCHLORIDE 2 MG/1
0.5 TABLET ORAL
Refills: 0 | Status: DISCONTINUED | OUTPATIENT
Start: 2024-09-13 | End: 2024-09-13

## 2024-09-13 RX ORDER — KETOROLAC TROMETHAMINE 30 MG/ML
15 INJECTION, SOLUTION INTRAMUSCULAR EVERY 6 HOURS
Refills: 0 | Status: DISCONTINUED | OUTPATIENT
Start: 2024-09-13 | End: 2024-09-14

## 2024-09-13 RX ORDER — HYDROMORPHONE HYDROCHLORIDE 2 MG/1
0.5 TABLET ORAL ONCE
Refills: 0 | Status: DISCONTINUED | OUTPATIENT
Start: 2024-09-13 | End: 2024-09-13

## 2024-09-13 RX ORDER — CEFAZOLIN SODIUM 2 G/100ML
2000 INJECTION, SOLUTION INTRAVENOUS EVERY 8 HOURS
Refills: 0 | Status: COMPLETED | OUTPATIENT
Start: 2024-09-13 | End: 2024-09-14

## 2024-09-13 RX ORDER — POTASSIUM CHLORIDE 10 MEQ
40 TABLET, EXT RELEASE, PARTICLES/CRYSTALS ORAL ONCE
Refills: 0 | Status: COMPLETED | OUTPATIENT
Start: 2024-09-13 | End: 2024-09-13

## 2024-09-13 RX ADMIN — KETOROLAC TROMETHAMINE 15 MILLIGRAM(S): 30 INJECTION, SOLUTION INTRAMUSCULAR at 22:10

## 2024-09-13 RX ADMIN — OXYCODONE HYDROCHLORIDE 10 MILLIGRAM(S): 5 TABLET ORAL at 07:05

## 2024-09-13 RX ADMIN — Medication 40 MILLIEQUIVALENT(S): at 07:07

## 2024-09-13 RX ADMIN — OXYCODONE HYDROCHLORIDE 10 MILLIGRAM(S): 5 TABLET ORAL at 07:50

## 2024-09-13 RX ADMIN — Medication 1000 MILLILITER(S): at 16:59

## 2024-09-13 RX ADMIN — OXYCODONE HYDROCHLORIDE 10 MILLIGRAM(S): 5 TABLET ORAL at 01:35

## 2024-09-13 RX ADMIN — KETOROLAC TROMETHAMINE 15 MILLIGRAM(S): 30 INJECTION, SOLUTION INTRAMUSCULAR at 21:52

## 2024-09-13 RX ADMIN — RANOLAZINE 500 MILLIGRAM(S): 500 TABLET, FILM COATED, EXTENDED RELEASE ORAL at 18:03

## 2024-09-13 RX ADMIN — OXYCODONE HYDROCHLORIDE 10 MILLIGRAM(S): 5 TABLET ORAL at 02:34

## 2024-09-13 RX ADMIN — HYDROMORPHONE HYDROCHLORIDE 0.5 MILLIGRAM(S): 2 TABLET ORAL at 04:05

## 2024-09-13 RX ADMIN — CHLORHEXIDINE GLUCONATE 1 APPLICATION(S): 40 SOLUTION TOPICAL at 07:09

## 2024-09-13 RX ADMIN — EZETIMIBE 10 MILLIGRAM(S): 10 TABLET ORAL at 18:04

## 2024-09-13 RX ADMIN — HYDROMORPHONE HYDROCHLORIDE 0.5 MILLIGRAM(S): 2 TABLET ORAL at 03:05

## 2024-09-13 RX ADMIN — CEFAZOLIN SODIUM 100 MILLIGRAM(S): 2 INJECTION, SOLUTION INTRAVENOUS at 22:40

## 2024-09-13 RX ADMIN — Medication 25 MILLIGRAM(S): at 07:08

## 2024-09-13 RX ADMIN — RANOLAZINE 500 MILLIGRAM(S): 500 TABLET, FILM COATED, EXTENDED RELEASE ORAL at 07:37

## 2024-09-13 RX ADMIN — Medication 1 APPLICATION(S): at 07:00

## 2024-09-13 RX ADMIN — Medication 30 MILLILITER(S): at 16:59

## 2024-09-13 RX ADMIN — SERTRALINE HYDROCHLORIDE 200 MILLIGRAM(S): 50 TABLET, FILM COATED ORAL at 18:03

## 2024-09-13 RX ADMIN — ROSUVASTATIN CALCIUM 20 MILLIGRAM(S): 10 TABLET ORAL at 21:49

## 2024-09-13 NOTE — DISCHARGE NOTE PROVIDER - NSDCCPCAREPLAN_GEN_ALL_CORE_FT
PRINCIPAL DISCHARGE DIAGNOSIS  Diagnosis: Hip fracture, right  Assessment and Plan of Treatment: Diet: Continue regular diet upon discharge.   Activity: No heavy lifting > 25 lbs for 4 weeks. Avoid straining or excessive activity x 6 weeks.   -Continue to use your walker when ambulating until your postoperative follow up appointment.   Dressings: Keep dressing clean, dry, and intact. Your doctor will remove your bandage at your post-operative follow up appointment.   Other Care:   -You may shower when you get home but DO NOT soak dressing and/or incision. The water may run over your dressing/incision but DO NOT let the water directly hit your dressing/incision (take a shower with your wound away from the direct stream of water). NO hot tubes, NO bath tubs, NO swimming pools.   -Elevate your operative leg 2 feet above heart level for 2 hours in the morning, 2 hours in the afternoon, and 2 hours in the evening.   -Apply ice for 20min every time you elevate.   -Sit for 90 min/day: 45mins x2 or 30min x3  -DO NOT sit for more than the 90min/day. Walk or lay down when not elevating your leg.   -DO NOT place the elevation pillow behind your knees. Only place it under your calf and heel.   -DO NOT bend more than 45 degrees at the waist

## 2024-09-13 NOTE — DISCHARGE NOTE PROVIDER - HOSPITAL COURSE
This is a 85yo Female with PMH of CAD w/stents, HLD, psoriasis who presents to [facility] for orthopedic surgery. Patient s/p R IMN with Dr. Mathis on 9/13/24. Patient tolerated the procedure well without any intraoperative complications. Patient tolerated physical therapy well, and the pain was controlled. Patient is weight bearing as tolerated with cane/walker as needed. Seen by medical attending for continuity of care and management and cleared for safe discharge. Keep dressing/incision clean, dry and intact. Any suture/staples to be removed on post-op day #14 your office visit. Patient is on 2.5 mg of Eliquis BID for DVT prophylaxis, please take for 6 weeks unless otherwise instructed by your surgeon. Please follow up with Dr. Mathis in 2 weeks, call the office to make an appointment, [phone]. Please follow up with your PMD for continuity of care and management as medications may have changed.

## 2024-09-13 NOTE — DISCHARGE NOTE PROVIDER - NSDCMRMEDTOKEN_GEN_ALL_CORE_FT
aspirin 81 mg oral tablet: 1 tab(s) orally once a day  atenolol 25 mg oral tablet: 1 tab(s) orally once a day  Crestor 20 mg oral tablet: 1 tab(s) orally once a day  Hiprex 1 g oral tablet: 1 tab(s) orally once a day  Ranexa 500 mg oral tablet, extended release: 1 tab(s) orally 2 times a day  sertraline 200 mg oral capsule: 1 cap(s) orally once a day  Zetia 10 mg oral tablet: 1 tab(s) orally once a day   apixaban 2.5 mg oral tablet: 1 tab(s) orally every 12 hours take for at least 42 days post op  atenolol 25 mg oral tablet: 1 tab(s) orally once a day  Crestor 20 mg oral tablet: 1 tab(s) orally once a day  Hiprex 1 g oral tablet: 1 tab(s) orally once a day  oxyCODONE 5 mg oral tablet: 1 tab(s) orally every 6 hours as needed for  severe pain  pantoprazole 40 mg oral delayed release tablet: 1 tab(s) orally once a day take for 42 days post-op  Ranexa 500 mg oral tablet, extended release: 1 tab(s) orally 2 times a day  senna leaf extract oral tablet: 2 tab(s) orally once a day (at bedtime)  sertraline 200 mg oral capsule: 1 cap(s) orally once a day  Zetia 10 mg oral tablet: 1 tab(s) orally once a day

## 2024-09-13 NOTE — PACU DISCHARGE NOTE - NSCLINEINSERTRD_GEN_ALL_CORE
Problem: Discharge Barriers/Planning  Goal: Patient's continuum of care needs will be met  Outcome: PROGRESSING AS EXPECTED  Pt is going to  with HH, SW involve in care, CTM    Problem: Metabolic:  Goal: Ability to maintain appropriate glucose levels will improve  Outcome: PROGRESSING AS EXPECTED  FSBS checked, insulin given per MAR, CTM       No

## 2024-09-13 NOTE — DISCHARGE NOTE PROVIDER - ATTENDING ATTESTATION STATEMENT
Physical Therapy    Patient not seen in therapy.     On hold due to patient status post surgical/invasive medical procedure, will need new orders to resume      Patient in OR for ORIF of ankle. Please reconsult as appropriate post op with updated WB orders and recommendations.       OBJECTIVE                         Therapy procedure time and total treatment time can be found documented on the Time Entry flowsheet   I have personally seen and examined the patient. I have collaborated with and supervised the

## 2024-09-13 NOTE — PACU DISCHARGE NOTE - COMMENTS
Had post-op delirium, difficult to re-orient (already AMS at baseline), given Precedex 4mcg x1 in PACU with good response    No other apparent anesthesia complications

## 2024-09-13 NOTE — DISCHARGE NOTE PROVIDER - NSDCCPTREATMENT_GEN_ALL_CORE_FT
PRINCIPAL PROCEDURE  Procedure: Intramedullary nailing of femur  Findings and Treatment: Pain control:        -Acetaminophen 500mg - 2 tabs every 8 hours  As needed:        -Tramadol 50mg - 1 tab every 6 hours - Take only if needed for MODERATE pain       -oxycodone 5mg - 1 tab every 4-6 hours - Take only if needed for SEVERE or BREAKTHROUGH pain  Oxycodone and Tramadol have been sent to your pharmacy. Please do not drive, operate machinery, or make important decisions while taking these medications.   Other Medications:  -Eliquis 2.5 mg every 12 hours - to prevent blood clots (for 6 weeks post operatively.)  -Protonix 40mg - 1 tab every 24 hours - to prevent stomach irritation/ulcers  -Senna 8.6mg - 2 pills every 24 hours - stool softener  -Miralax 17g - daily - constipation   Follow up: Please follow up at your prescheduled post-operative follow up appointment with Dr. Mathis for 2 weeks after hospital discharge. Please call with any questions or concerns including fevers, worsening pain, pus from the wounds, redness of the skin and difficulty breathing or heaviness in the chest at [phone].     PRINCIPAL PROCEDURE  Procedure: Intramedullary nailing of femur  Findings and Treatment: Pain control:        -Acetaminophen 500mg - 2 tabs every 8 hours  As needed:        -Tramadol 50mg - 1 tab every 6 hours - Take only if needed for MODERATE pain       -oxycodone 5mg - 1 tab every 4-6 hours - Take only if needed for SEVERE or BREAKTHROUGH pain  Please do not drive, operate machinery, or make important decisions while taking these medications.   Other Medications:  -Eliquis 2.5 mg every 12 hours - to prevent blood clots (for 6 weeks post operatively.)  -Protonix 40mg - 1 tab every 24 hours - to prevent stomach irritation/ulcers  -Senna 8.6mg - 2 pills every 24 hours - stool softener  -Miralax 17g - daily - constipation   Follow up: Please follow up at your prescheduled post-operative follow up appointment with Dr. Mathis for 2 weeks after hospital discharge. Please call with any questions or concerns including fevers, worsening pain, pus from the wounds, redness of the skin and difficulty breathing or heaviness in the chest at [phone].

## 2024-09-13 NOTE — DISCHARGE NOTE PROVIDER - CARE PROVIDER_API CALL
Juan M Mathis  Orthopaedic Surgery  [address]  [place] [address]  Phone: [phone]  Fax: [phone]  Follow Up Time:

## 2024-09-13 NOTE — PRE-OP CHECKLIST - COMMENTS
oxycodone atenolol ranexa potassium sips of water oxycodone atenolol ranexa potassium sips of water see emar

## 2024-09-14 LAB
ANION GAP SERPL CALC-SCNC: 10 MMOL/L — SIGNIFICANT CHANGE UP (ref 7–14)
BUN SERPL-MCNC: 16 MG/DL — SIGNIFICANT CHANGE UP (ref 7–23)
CALCIUM SERPL-MCNC: 8.3 MG/DL — LOW (ref 8.4–10.5)
CHLORIDE SERPL-SCNC: 103 MMOL/L — SIGNIFICANT CHANGE UP (ref 98–107)
CO2 SERPL-SCNC: 25 MMOL/L — SIGNIFICANT CHANGE UP (ref 22–31)
CREAT SERPL-MCNC: 0.59 MG/DL — SIGNIFICANT CHANGE UP (ref 0.5–1.3)
EGFR: 88 ML/MIN/1.73M2 — SIGNIFICANT CHANGE UP
GLUCOSE SERPL-MCNC: 113 MG/DL — HIGH (ref 70–99)
HCT VFR BLD CALC: 30 % — LOW (ref 34.5–45)
HGB BLD-MCNC: 9.5 G/DL — LOW (ref 11.5–15.5)
MAGNESIUM SERPL-MCNC: 1.8 MG/DL — SIGNIFICANT CHANGE UP (ref 1.6–2.6)
MCHC RBC-ENTMCNC: 29.9 PG — SIGNIFICANT CHANGE UP (ref 27–34)
MCHC RBC-ENTMCNC: 31.7 GM/DL — LOW (ref 32–36)
MCV RBC AUTO: 94.3 FL — SIGNIFICANT CHANGE UP (ref 80–100)
NRBC # BLD: 0 /100 WBCS — SIGNIFICANT CHANGE UP (ref 0–0)
NRBC # FLD: 0 K/UL — SIGNIFICANT CHANGE UP (ref 0–0)
PHOSPHATE SERPL-MCNC: 2.2 MG/DL — LOW (ref 2.5–4.5)
PLATELET # BLD AUTO: 173 K/UL — SIGNIFICANT CHANGE UP (ref 150–400)
POTASSIUM SERPL-MCNC: 4.4 MMOL/L — SIGNIFICANT CHANGE UP (ref 3.5–5.3)
POTASSIUM SERPL-SCNC: 4.4 MMOL/L — SIGNIFICANT CHANGE UP (ref 3.5–5.3)
RBC # BLD: 3.18 M/UL — LOW (ref 3.8–5.2)
RBC # FLD: 15.8 % — HIGH (ref 10.3–14.5)
SODIUM SERPL-SCNC: 138 MMOL/L — SIGNIFICANT CHANGE UP (ref 135–145)
WBC # BLD: 9.34 K/UL — SIGNIFICANT CHANGE UP (ref 3.8–10.5)
WBC # FLD AUTO: 9.34 K/UL — SIGNIFICANT CHANGE UP (ref 3.8–10.5)

## 2024-09-14 PROCEDURE — 99232 SBSQ HOSP IP/OBS MODERATE 35: CPT

## 2024-09-14 RX ADMIN — KETOROLAC TROMETHAMINE 15 MILLIGRAM(S): 30 INJECTION, SOLUTION INTRAMUSCULAR at 13:56

## 2024-09-14 RX ADMIN — RANOLAZINE 500 MILLIGRAM(S): 500 TABLET, FILM COATED, EXTENDED RELEASE ORAL at 05:36

## 2024-09-14 RX ADMIN — CEFAZOLIN SODIUM 100 MILLIGRAM(S): 2 INJECTION, SOLUTION INTRAVENOUS at 06:12

## 2024-09-14 RX ADMIN — RANOLAZINE 500 MILLIGRAM(S): 500 TABLET, FILM COATED, EXTENDED RELEASE ORAL at 18:05

## 2024-09-14 RX ADMIN — KETOROLAC TROMETHAMINE 15 MILLIGRAM(S): 30 INJECTION, SOLUTION INTRAMUSCULAR at 12:04

## 2024-09-14 RX ADMIN — ROSUVASTATIN CALCIUM 20 MILLIGRAM(S): 10 TABLET ORAL at 22:02

## 2024-09-14 RX ADMIN — APIXABAN 2.5 MILLIGRAM(S): 5 TABLET, FILM COATED ORAL at 18:04

## 2024-09-14 RX ADMIN — OXYCODONE HYDROCHLORIDE 10 MILLIGRAM(S): 5 TABLET ORAL at 09:23

## 2024-09-14 RX ADMIN — KETOROLAC TROMETHAMINE 15 MILLIGRAM(S): 30 INJECTION, SOLUTION INTRAMUSCULAR at 03:10

## 2024-09-14 RX ADMIN — OXYCODONE HYDROCHLORIDE 10 MILLIGRAM(S): 5 TABLET ORAL at 10:01

## 2024-09-14 RX ADMIN — SERTRALINE HYDROCHLORIDE 200 MILLIGRAM(S): 50 TABLET, FILM COATED ORAL at 12:03

## 2024-09-14 RX ADMIN — KETOROLAC TROMETHAMINE 15 MILLIGRAM(S): 30 INJECTION, SOLUTION INTRAMUSCULAR at 03:30

## 2024-09-14 RX ADMIN — EZETIMIBE 10 MILLIGRAM(S): 10 TABLET ORAL at 12:03

## 2024-09-14 RX ADMIN — APIXABAN 2.5 MILLIGRAM(S): 5 TABLET, FILM COATED ORAL at 05:36

## 2024-09-14 NOTE — PHYSICAL THERAPY INITIAL EVALUATION ADULT - NSPTDISCHREC_GEN_A_CORE
Inpatient rehabilitative services to address functional limitations associated with Intramedullary nailing of femur

## 2024-09-14 NOTE — PHYSICAL THERAPY INITIAL EVALUATION ADULT - LEVEL OF INDEPENDENCE: GAIT, REHAB EVAL
Lower extremity weakness.  L hip grossly 0-45 degrees.  Will continue to monitor patients functional abilities./unable to perform

## 2024-09-14 NOTE — PHYSICAL THERAPY INITIAL EVALUATION ADULT - ADDITIONAL COMMENTS
Pt lives in [facility] for half the year.  Pt uses DME when ambulating outside.  Pt has hx of falls.   Patients Current SpO2: 99%

## 2024-09-14 NOTE — OCCUPATIONAL THERAPY INITIAL EVALUATION ADULT - PERTINENT HX OF CURRENT PROBLEM, REHAB EVAL
84 year old female with history of CAD s/p PCI, SHD s/p MMA (2023), HTN, HLD admitted s/p fall resulting in right intertrochanteric femur fracture. Now s/p right hip IMN on 9/13/24.

## 2024-09-14 NOTE — OCCUPATIONAL THERAPY INITIAL EVALUATION ADULT - GENERAL OBSERVATIONS, REHAB EVAL
Patient received semisupine in bed in NAD; agreeable to participate in OT evaluation. +primafit. Spouse at bedside.

## 2024-09-14 NOTE — PHYSICAL THERAPY INITIAL EVALUATION ADULT - PERTINENT HX OF CURRENT PROBLEM, REHAB EVAL
84yFemale c/o R hip pain s/p mechanical fall while walking to the bathroom at 3am. Patient denies head hit or LOC. Patient denies numbness or tingling in the RLE. Patient denies any other injuries. At baseline, ambulates w RW. Pt takes aspirin 81 qd given she has multiple stents.

## 2024-09-15 PROCEDURE — 99232 SBSQ HOSP IP/OBS MODERATE 35: CPT

## 2024-09-15 RX ADMIN — Medication 30 MILLILITER(S): at 09:18

## 2024-09-15 RX ADMIN — RANOLAZINE 500 MILLIGRAM(S): 500 TABLET, FILM COATED, EXTENDED RELEASE ORAL at 07:06

## 2024-09-15 RX ADMIN — APIXABAN 2.5 MILLIGRAM(S): 5 TABLET, FILM COATED ORAL at 17:50

## 2024-09-15 RX ADMIN — ROSUVASTATIN CALCIUM 20 MILLIGRAM(S): 10 TABLET ORAL at 21:24

## 2024-09-15 RX ADMIN — OXYCODONE HYDROCHLORIDE 5 MILLIGRAM(S): 5 TABLET ORAL at 12:15

## 2024-09-15 RX ADMIN — Medication 2 TABLET(S): at 21:24

## 2024-09-15 RX ADMIN — SERTRALINE HYDROCHLORIDE 200 MILLIGRAM(S): 50 TABLET, FILM COATED ORAL at 12:15

## 2024-09-15 RX ADMIN — OXYCODONE HYDROCHLORIDE 5 MILLIGRAM(S): 5 TABLET ORAL at 13:15

## 2024-09-15 RX ADMIN — EZETIMIBE 10 MILLIGRAM(S): 10 TABLET ORAL at 12:15

## 2024-09-15 RX ADMIN — RANOLAZINE 500 MILLIGRAM(S): 500 TABLET, FILM COATED, EXTENDED RELEASE ORAL at 17:50

## 2024-09-15 RX ADMIN — APIXABAN 2.5 MILLIGRAM(S): 5 TABLET, FILM COATED ORAL at 07:04

## 2024-09-16 ENCOUNTER — NON-APPOINTMENT (OUTPATIENT)
Age: 84
End: 2024-09-16

## 2024-09-16 ENCOUNTER — TRANSCRIPTION ENCOUNTER (OUTPATIENT)
Age: 84
End: 2024-09-16

## 2024-09-16 LAB
ANION GAP SERPL CALC-SCNC: 8 MMOL/L — SIGNIFICANT CHANGE UP (ref 7–14)
BUN SERPL-MCNC: 9 MG/DL — SIGNIFICANT CHANGE UP (ref 7–23)
CALCIUM SERPL-MCNC: 8.1 MG/DL — LOW (ref 8.4–10.5)
CHLORIDE SERPL-SCNC: 102 MMOL/L — SIGNIFICANT CHANGE UP (ref 98–107)
CO2 SERPL-SCNC: 28 MMOL/L — SIGNIFICANT CHANGE UP (ref 22–31)
CREAT SERPL-MCNC: 0.47 MG/DL — LOW (ref 0.5–1.3)
EGFR: 93 ML/MIN/1.73M2 — SIGNIFICANT CHANGE UP
GLUCOSE SERPL-MCNC: 102 MG/DL — HIGH (ref 70–99)
HCT VFR BLD CALC: 27.2 % — LOW (ref 34.5–45)
HGB BLD-MCNC: 8.8 G/DL — LOW (ref 11.5–15.5)
MCHC RBC-ENTMCNC: 30.8 PG — SIGNIFICANT CHANGE UP (ref 27–34)
MCHC RBC-ENTMCNC: 32.4 GM/DL — SIGNIFICANT CHANGE UP (ref 32–36)
MCV RBC AUTO: 95.1 FL — SIGNIFICANT CHANGE UP (ref 80–100)
NRBC # BLD: 0 /100 WBCS — SIGNIFICANT CHANGE UP (ref 0–0)
NRBC # FLD: 0 K/UL — SIGNIFICANT CHANGE UP (ref 0–0)
PLATELET # BLD AUTO: 197 K/UL — SIGNIFICANT CHANGE UP (ref 150–400)
POTASSIUM SERPL-MCNC: 4.3 MMOL/L — SIGNIFICANT CHANGE UP (ref 3.5–5.3)
POTASSIUM SERPL-SCNC: 4.3 MMOL/L — SIGNIFICANT CHANGE UP (ref 3.5–5.3)
RBC # BLD: 2.86 M/UL — LOW (ref 3.8–5.2)
RBC # FLD: 15.9 % — HIGH (ref 10.3–14.5)
SODIUM SERPL-SCNC: 138 MMOL/L — SIGNIFICANT CHANGE UP (ref 135–145)
WBC # BLD: 7.76 K/UL — SIGNIFICANT CHANGE UP (ref 3.8–10.5)
WBC # FLD AUTO: 7.76 K/UL — SIGNIFICANT CHANGE UP (ref 3.8–10.5)

## 2024-09-16 PROCEDURE — 99232 SBSQ HOSP IP/OBS MODERATE 35: CPT

## 2024-09-16 RX ORDER — IBUPROFEN 600 MG
800 TABLET ORAL EVERY 6 HOURS
Refills: 0 | Status: DISCONTINUED | OUTPATIENT
Start: 2024-09-16 | End: 2024-09-17

## 2024-09-16 RX ORDER — METHENAMINE MANDELATE 1 G
0.5 TABLET ORAL
Refills: 0 | Status: DISCONTINUED | OUTPATIENT
Start: 2024-09-16 | End: 2024-09-17

## 2024-09-16 RX ADMIN — Medication 3 MILLIGRAM(S): at 21:31

## 2024-09-16 RX ADMIN — RANOLAZINE 500 MILLIGRAM(S): 500 TABLET, FILM COATED, EXTENDED RELEASE ORAL at 07:38

## 2024-09-16 RX ADMIN — Medication 0.5 GRAM(S): at 18:25

## 2024-09-16 RX ADMIN — Medication 800 MILLIGRAM(S): at 14:39

## 2024-09-16 RX ADMIN — Medication 2 TABLET(S): at 21:31

## 2024-09-16 RX ADMIN — Medication 800 MILLIGRAM(S): at 13:39

## 2024-09-16 RX ADMIN — ROSUVASTATIN CALCIUM 20 MILLIGRAM(S): 10 TABLET ORAL at 21:31

## 2024-09-16 RX ADMIN — SERTRALINE HYDROCHLORIDE 200 MILLIGRAM(S): 50 TABLET, FILM COATED ORAL at 13:39

## 2024-09-16 RX ADMIN — RANOLAZINE 500 MILLIGRAM(S): 500 TABLET, FILM COATED, EXTENDED RELEASE ORAL at 18:27

## 2024-09-16 RX ADMIN — EZETIMIBE 10 MILLIGRAM(S): 10 TABLET ORAL at 13:39

## 2024-09-16 RX ADMIN — APIXABAN 2.5 MILLIGRAM(S): 5 TABLET, FILM COATED ORAL at 07:38

## 2024-09-16 RX ADMIN — APIXABAN 2.5 MILLIGRAM(S): 5 TABLET, FILM COATED ORAL at 18:28

## 2024-09-16 NOTE — PROGRESS NOTE ADULT - PROBLEM SELECTOR PLAN 1
s/p fall found to have right hip fracture s/p R hip IMN  - management and pain control per ortho  - bowel regimen with opiates  - PT evaluation post op    PREOP: RCRI 1 (6% 30 day risk of MACE). METS >4. EKG 1st degree AV block. TTE reviewed by luke - preserved LV function and mild AS. Medically optimized to proceed to OR. Intermediate risk for intermediate risk procedure.
s/p fall found to have right hip fracture pending OR  - management and pain control per ortho  - bowel regimen with opiates  - PT evaluation post op    PREOP: RCRI 1 (6% 30 day risk of MACE). METS >4. EKG 1st degree AV block. TTE reviewed by cards - preserved LV function and mild AS. Medically optimized to proceed to OR. Intermediate risk for intermediate risk procedure.
s/p fall found to have right hip fracture s/p R hip IMN  - management and pain control per ortho  - bowel regimen with opiates  - PT evaluation post op - recs ANUSHKA
s/p fall found to have right hip fracture s/p R hip IMN  - management and pain control per ortho  - bowel regimen with opiates  - PT evaluation post op    PREOP: RCRI 1 (6% 30 day risk of MACE). METS >4. EKG 1st degree AV block. TTE reviewed by luke - preserved LV function and mild AS. Medically optimized to proceed to OR. Intermediate risk for intermediate risk procedure.

## 2024-09-16 NOTE — DISCHARGE NOTE NURSING/CASE MANAGEMENT/SOCIAL WORK - NSDCPECAREGIVERED_GEN_ALL_CORE
Discharge instructions after my hip fracture surgery  caring for my incision  flu shot  apixaban  IM nail hip  prevention of falls

## 2024-09-16 NOTE — PROGRESS NOTE ADULT - PROBLEM SELECTOR PLAN 4
DVT ppx: per ortho
DVT ppx: per ortho
DVT ppx: on hold for OR  DIET: NPO for OR  DISPO: PT post op
DVT ppx: per ortho

## 2024-09-16 NOTE — PROGRESS NOTE ADULT - PROBLEM SELECTOR PLAN 3
BP stable  - c/w atenolol   - monitor vital signs

## 2024-09-16 NOTE — PROGRESS NOTE ADULT - TIME BILLING
Review of laboratory data, radiology results, consultants' recommendations, documentation in [place], discussion with patient/advanced care providers and interdisciplinary staff (such as , social workers, etc). Interventions were performed as documented above.

## 2024-09-16 NOTE — PROGRESS NOTE ADULT - PROBLEM SELECTOR PLAN 2
s/p PCI 26 years ago  - reports intermittent chest pain and LE swelling  - TTE with mild diastolic dysfunction  - c/w home meds: asa, atenolol, rosuvastatin, ranexa

## 2024-09-16 NOTE — DISCHARGE NOTE NURSING/CASE MANAGEMENT/SOCIAL WORK - NSDCPNINST_GEN_ALL_CORE
Notify DR Mathis if you experience any increase in pain not relieved with medication, any redness, drainage or swelling around incision or any fever >100.5.  Drink plenty of fluids.  No heavy lifting or straining.  Continue to follow fall precautions handout.  Use over the counter stool softeners to assist with constipation which can be a side effect of narcotic pain medication.  Continue to apply cold therapy.

## 2024-09-16 NOTE — DISCHARGE NOTE NURSING/CASE MANAGEMENT/SOCIAL WORK - PATIENT PORTAL LINK FT
You can access the FollowMyHealth Patient Portal offered by [facility] by registering at the following website: [email or web address]. By joining [facility]’s FollowMyHealth portal, you will also be able to view your health information using other applications (apps) compatible with our system.

## 2024-09-16 NOTE — DISCHARGE NOTE NURSING/CASE MANAGEMENT/SOCIAL WORK - NSDCPEFALRISK_GEN_ALL_CORE
For information on Fall & Injury Prevention, visit: [email or web address] OR  [email or web address] OR  https://www.cdc.gov/steadi/patient.html

## 2024-09-17 VITALS
DIASTOLIC BLOOD PRESSURE: 40 MMHG | TEMPERATURE: 99 F | HEART RATE: 74 BPM | SYSTOLIC BLOOD PRESSURE: 122 MMHG | RESPIRATION RATE: 16 BRPM | OXYGEN SATURATION: 95 %

## 2024-09-17 RX ORDER — ASPIRIN 81 MG
1 TABLET, DELAYED RELEASE (ENTERIC COATED) ORAL
Refills: 0 | DISCHARGE

## 2024-09-17 RX ORDER — ASPIRIN 81 MG
1 TABLET, DELAYED RELEASE (ENTERIC COATED) ORAL
Qty: 0 | Refills: 0 | DISCHARGE

## 2024-09-17 RX ORDER — PANTOPRAZOLE SODIUM 40 MG
1 TABLET, DELAYED RELEASE (ENTERIC COATED) ORAL
Qty: 0 | Refills: 0 | DISCHARGE

## 2024-09-17 RX ORDER — APIXABAN 5 MG/1
1 TABLET, FILM COATED ORAL
Qty: 0 | Refills: 0 | DISCHARGE
Start: 2024-09-17

## 2024-09-17 RX ORDER — OXYCODONE HYDROCHLORIDE 5 MG/1
1 TABLET ORAL
Qty: 0 | Refills: 0 | DISCHARGE
Start: 2024-09-17

## 2024-09-17 RX ORDER — SENNA 187 MG
2 TABLET ORAL
Qty: 0 | Refills: 0 | DISCHARGE
Start: 2024-09-17

## 2024-09-17 RX ADMIN — APIXABAN 2.5 MILLIGRAM(S): 5 TABLET, FILM COATED ORAL at 07:40

## 2024-09-17 RX ADMIN — Medication 800 MILLIGRAM(S): at 07:42

## 2024-09-17 RX ADMIN — RANOLAZINE 500 MILLIGRAM(S): 500 TABLET, FILM COATED, EXTENDED RELEASE ORAL at 07:40

## 2024-09-17 RX ADMIN — Medication 0.5 GRAM(S): at 07:38

## 2024-09-17 RX ADMIN — Medication 800 MILLIGRAM(S): at 08:06

## 2024-09-17 NOTE — PROGRESS NOTE ADULT - ASSESSMENT
A/P: Patient is a 84y y/o Female s/p R hip IMN, POD #0   - Pain control  - Antibiotics - Ancef postop  - DVT ppx  - Eliquis 2.5 mg BID  - Incentive spirometry  - Venodynes  - F/U AM Labs  - F/U XR L Hand/Wrist/Forearm  - PT/OT  - WBAT  - Notify Orthopedics with any questions
84yFemale with Right Intertrochanteric Fracture    Plan:  - OR for R hip IMN today  - Pain control  - IS  - NPO/IVF  - Medical and cardiology clearance documented    Areli Nugent, PGY-2  Orthopedic Surgery  [id number]
Patient is a 84y y/o Female s/p R hip IMN    PLAN  - WBAT RLE  - Pain control  - Antibiotics - Ancef postopx 24 hours  - DVT ppx  - Eliquis 2.5 mg BID  - Incentive spirometry  - Venodynes  - F/U AM Labs  - F/U XR L Hand/Wrist/Forearm  - PT/OT  - Dispo: pending PT ANUSHKA quiroz    
Patient is a 84y y/o Female s/p R hip IMN DOS 9/13/2024    PLAN  - WBAT RLE  - Pain control  - Antibiotics - Ancef postop x 24 hours, completed  - DVT ppx - Eliquis 2.5 mg BID  - Incentive spirometry  - Venodynes  - F/U AM Labs  - F/U XR L Hand/Wrist/Forearm - patient refused  - PT/OT  - Dispo: recommended for ANUSHKA
Patient is a 84y y/o Female s/p R hip IMN    PLAN  - WBAT RLE  - Pain control  - Antibiotics - Ancef postopx 24 hours  - DVT ppx  - Eliquis 2.5 mg BID  - Incentive spirometry  - Venodynes  - F/U AM Labs  - F/U XR L Hand/Wrist/Forearm  - PT/OT  - Dispo: pending PT eval     
Patient is a 84y y/o Female s/p R hip IMN DOS 9/13/2024    PLAN  - WBAT RLE  - Pain control  - Antibiotics - Ancef postop x 24 hours, completed  - DVT ppx - Eliquis 2.5 mg BID  - Incentive spirometry  - Venodynes  - F/U AM Labs  - F/U XR L Hand/Wrist/Forearm - patient refused  - PT/OT  - Dispo: recommended for ANUSHKA
84F with hx of CAD s/p PCI (26 years ago) on aspirin, SHD s/p MMA (2023), HTN, HLD who presents with right hip pain after fall found to have right hip fx pending right hip IMN (OR 9/13).
84F with hx of CAD s/p PCI (26 years ago) on aspirin, SHD s/p MMA (2023), HTN, HLD who presents with right hip pain after fall found to have right hip fx pending right hip IMN (OR 9/13).
84F with hx of CAD s/p PCI (26 years ago) on aspirin, SHD s/p MMA (2023), HTN, HLD who presents with right hip pain after fall found to have right hip fx s/p  right hip IMN (OR 9/13).
84F with hx of CAD s/p PCI (26 years ago) on aspirin, SHD s/p MMA (2023), HTN, HLD who presents with right hip pain after fall found to have right hip fx s/p  right hip IMN (OR 9/13).

## 2024-09-17 NOTE — PROGRESS NOTE ADULT - REASON FOR ADMISSION
R IT fx
R IT fracture s/p R IMN
R IT fx

## 2024-09-17 NOTE — PROGRESS NOTE ADULT - PROVIDER SPECIALTY LIST ADULT
Orthopedics
Hospitalist

## 2024-09-23 ENCOUNTER — NON-APPOINTMENT (OUTPATIENT)
Age: 84
End: 2024-09-23

## 2024-11-11 ENCOUNTER — APPOINTMENT (OUTPATIENT)
Dept: ORTHOPEDIC SURGERY | Facility: CLINIC | Age: 84
End: 2024-11-11
Payer: MEDICARE

## 2024-11-11 VITALS — BODY MASS INDEX: 25.1 KG/M2 | WEIGHT: 147 LBS | HEIGHT: 64 IN

## 2024-11-11 DIAGNOSIS — S72.144A NONDISPLACED INTERTROCHANTERIC FRACTURE OF RIGHT FEMUR, INITIAL ENCOUNTER FOR CLOSED FRACTURE: ICD-10-CM

## 2024-11-11 PROCEDURE — 99024 POSTOP FOLLOW-UP VISIT: CPT

## 2024-11-11 PROCEDURE — 73502 X-RAY EXAM HIP UNI 2-3 VIEWS: CPT

## (undated) DEVICE — PREP CHLORAPREP HI-LITE ORANGE 26ML

## (undated) DEVICE — DRAPE 3/4 SHEET 52X76"

## (undated) DEVICE — LIJ-CONSIGN SYNTHES TITANIUM TFN LOCKING SET: Type: DURABLE MEDICAL EQUIPMENT

## (undated) DEVICE — SUCTION YANKAUER OPEN TIP NO VENT CURVE

## (undated) DEVICE — LABELS BLANK W PEN

## (undated) DEVICE — GLV 8 PROTEXIS (WHITE)

## (undated) DEVICE — GUIDEWIRE SYNTHES 3.2MM X 400MM

## (undated) DEVICE — DRSG WEBRIL 4"

## (undated) DEVICE — SUT VICRYL PLUS 2-0 27" FS-1 UNDYED

## (undated) DEVICE — DRSG ADAPTIC 3 X 8"

## (undated) DEVICE — SUCTION YANKAUER NO CONTROL VENT

## (undated) DEVICE — TAPE SILK 3"

## (undated) DEVICE — SYNTHES REAMING ROD WITH BALL TIP 2.5MM 950MM

## (undated) DEVICE — SUT VICRYL 1 36" CTX UNDYED

## (undated) DEVICE — PACK LIJ BASIC ORTHO

## (undated) DEVICE — ELCTR BOVIE PENCIL SMOKE EVACUATION

## (undated) DEVICE — DRAPE SHOWER CURTAIN ISOLATION

## (undated) DEVICE — DRAPE U (BLUE) 60 X 60"

## (undated) DEVICE — DRSG XEROFORM 5 X 9"

## (undated) DEVICE — DRILL BIT SYNTHES ORTHO CALIBRATED 4.2MM X 330MM

## (undated) DEVICE — DRSG COBAN 4" LF NONSTERILE

## (undated) DEVICE — STAPLER SKIN VISI-STAT 35 WIDE

## (undated) DEVICE — DRSG KLING 4"

## (undated) DEVICE — LIJ-CONSIGN SYNTHES TFN PERCUTANEOUS & LAG SCREW INST: Type: DURABLE MEDICAL EQUIPMENT

## (undated) DEVICE — PREP SCRUB BRUSH W CHG 4%

## (undated) DEVICE — ELCTR GROUNDING PAD ADULT COVIDIEN

## (undated) DEVICE — SOL IRR POUR H2O 1500ML

## (undated) DEVICE — POSITIONER STRAP ARMBOARD VELCRO TS-30

## (undated) DEVICE — SUT VICRYL PLUS 0 27" OS-6 UNDYED

## (undated) DEVICE — SUT MONOCRYL 4-0 27" PS-2 UNDYED

## (undated) DEVICE — SOL IRR POUR NS 0.9% 1500ML

## (undated) DEVICE — LAP PAD W RING 18 X 18"

## (undated) DEVICE — GLV 8.5 PROTEXIS (WHITE)

## (undated) DEVICE — DRAPE C ARM UNIVERSAL

## (undated) DEVICE — VENODYNE/SCD SLEEVE CALF MEDIUM